# Patient Record
Sex: MALE | Race: WHITE | Employment: FULL TIME | URBAN - METROPOLITAN AREA
[De-identification: names, ages, dates, MRNs, and addresses within clinical notes are randomized per-mention and may not be internally consistent; named-entity substitution may affect disease eponyms.]

---

## 2017-08-18 PROCEDURE — 84153 ASSAY OF PSA TOTAL: CPT

## 2017-08-18 PROCEDURE — 85025 COMPLETE CBC W/AUTO DIFF WBC: CPT

## 2017-08-18 PROCEDURE — 84403 ASSAY OF TOTAL TESTOSTERONE: CPT

## 2017-08-19 ENCOUNTER — LAB ENCOUNTER (OUTPATIENT)
Dept: LAB | Age: 57
End: 2017-08-19
Attending: INTERNAL MEDICINE
Payer: COMMERCIAL

## 2017-08-19 DIAGNOSIS — E23.0 HYPOPITUITARISM (HCC): Primary | ICD-10-CM

## 2017-08-19 LAB
BASOPHILS # BLD AUTO: 0.02 X10(3) UL (ref 0–0.1)
BASOPHILS NFR BLD AUTO: 0.5 %
EOSINOPHIL # BLD AUTO: 0.07 X10(3) UL (ref 0–0.3)
EOSINOPHIL NFR BLD AUTO: 1.8 %
ERYTHROCYTE [DISTWIDTH] IN BLOOD BY AUTOMATED COUNT: 15 % (ref 11.5–16)
HCT VFR BLD AUTO: 47.7 % (ref 37–53)
HGB BLD-MCNC: 14.9 G/DL (ref 13–17)
IMMATURE GRANULOCYTE COUNT: 0.03 X10(3) UL (ref 0–1)
IMMATURE GRANULOCYTE RATIO %: 0.8 %
LYMPHOCYTES # BLD AUTO: 1.65 X10(3) UL (ref 0.9–4)
LYMPHOCYTES NFR BLD AUTO: 42.3 %
MCH RBC QN AUTO: 28.1 PG (ref 27–33.2)
MCHC RBC AUTO-ENTMCNC: 31.2 G/DL (ref 31–37)
MCV RBC AUTO: 89.8 FL (ref 80–99)
MONOCYTES # BLD AUTO: 0.53 X10(3) UL (ref 0.1–0.6)
MONOCYTES NFR BLD AUTO: 13.6 %
NEUTROPHIL ABS PRELIM: 1.6 X10 (3) UL (ref 1.3–6.7)
NEUTROPHILS # BLD AUTO: 1.6 X10(3) UL (ref 1.3–6.7)
NEUTROPHILS NFR BLD AUTO: 41 %
PLATELET # BLD AUTO: 266 10(3)UL (ref 150–450)
PSA SERPL-MCNC: 0.65 NG/ML (ref 0.01–4)
RBC # BLD AUTO: 5.31 X10(6)UL (ref 4.3–5.7)
RED CELL DISTRIBUTION WIDTH-SD: 49.8 FL (ref 35.1–46.3)
TESTOSTERONE: 651.9 NG/DL (ref 241–827)
WBC # BLD AUTO: 3.9 X10(3) UL (ref 4–13)

## 2017-10-17 ENCOUNTER — OFFICE VISIT (OUTPATIENT)
Dept: FAMILY MEDICINE CLINIC | Facility: CLINIC | Age: 57
End: 2017-10-17

## 2017-10-17 VITALS
OXYGEN SATURATION: 98 % | SYSTOLIC BLOOD PRESSURE: 122 MMHG | RESPIRATION RATE: 16 BRPM | WEIGHT: 284 LBS | TEMPERATURE: 100 F | DIASTOLIC BLOOD PRESSURE: 80 MMHG | HEIGHT: 70 IN | BODY MASS INDEX: 40.66 KG/M2 | HEART RATE: 84 BPM

## 2017-10-17 DIAGNOSIS — J01.00 ACUTE NON-RECURRENT MAXILLARY SINUSITIS: Primary | ICD-10-CM

## 2017-10-17 PROCEDURE — 99213 OFFICE O/P EST LOW 20 MIN: CPT | Performed by: PHYSICIAN ASSISTANT

## 2017-10-17 RX ORDER — AMOXICILLIN AND CLAVULANATE POTASSIUM 875; 125 MG/1; MG/1
1 TABLET, FILM COATED ORAL 2 TIMES DAILY
Qty: 20 TABLET | Refills: 0 | Status: SHIPPED | OUTPATIENT
Start: 2017-10-17 | End: 2017-10-27

## 2017-10-17 RX ORDER — FLUTICASONE PROPIONATE 50 MCG
2 SPRAY, SUSPENSION (ML) NASAL DAILY
Qty: 1 INHALER | Refills: 0 | Status: SHIPPED | OUTPATIENT
Start: 2017-10-17 | End: 2019-04-30

## 2017-10-17 NOTE — PATIENT INSTRUCTIONS
-Sudafed  -Flonase  -Cool mist humidifier at night  -Warm tea with honey  -Motrin for pain or fever  -Go to ER with any worsening symptoms            Acute Bacterial Rhinosinusitis (ABRS)    Acute bacterial rhinosinusitis (ABRS) is an infection of your bryan · Antibiotic medicine. This is for symptoms that last for at least 10 to 14 days. · Nasal corticosteroid medicine. Drops or spray used in the nose can lessen swelling and congestion. · Over-the-counter pain medicine.  This is to lessen sinus pain and pres

## 2018-05-08 ENCOUNTER — CHARTING TRANS (OUTPATIENT)
Dept: OTHER | Age: 58
End: 2018-05-08

## 2019-10-01 RX ORDER — TRIAMCINOLONE ACETONIDE 0.25 MG/G
OINTMENT TOPICAL
Qty: 15 G | Refills: 0 | Status: SHIPPED | OUTPATIENT
Start: 2019-10-01

## 2020-01-27 ENCOUNTER — OFFICE VISIT (OUTPATIENT)
Dept: FAMILY MEDICINE CLINIC | Facility: CLINIC | Age: 60
End: 2020-01-27
Payer: COMMERCIAL

## 2020-01-27 VITALS
HEIGHT: 70 IN | TEMPERATURE: 98 F | OXYGEN SATURATION: 99 % | DIASTOLIC BLOOD PRESSURE: 84 MMHG | HEART RATE: 57 BPM | BODY MASS INDEX: 42.8 KG/M2 | SYSTOLIC BLOOD PRESSURE: 126 MMHG | RESPIRATION RATE: 16 BRPM | WEIGHT: 299 LBS

## 2020-01-27 DIAGNOSIS — J39.2 THROAT IRRITATION: Primary | ICD-10-CM

## 2020-01-27 PROCEDURE — 99203 OFFICE O/P NEW LOW 30 MIN: CPT | Performed by: NURSE PRACTITIONER

## 2020-01-27 RX ORDER — FLUTICASONE PROPIONATE 50 MCG
2 SPRAY, SUSPENSION (ML) NASAL DAILY
Qty: 1 BOTTLE | Refills: 0 | Status: SHIPPED | OUTPATIENT
Start: 2020-01-27 | End: 2020-02-26

## 2020-01-28 NOTE — PROGRESS NOTES
CHIEF COMPLAINT:   Patient presents with: Other: dry throat sx 2 weeks. HPI:   Heron Patton is a 61year old male presents to clinic with symptoms of  Dry, scratchy sore throat. Patient has had for 2 weeks.  Reports mostly dry throat, relieved by dri 57   Temp 98.2 °F (36.8 °C) (Oral)   Resp 16   Ht 70\"   Wt 299 lb (135.6 kg)   SpO2 99%   BMI 42.90 kg/m²   GENERAL: well developed, well nourished,in no apparent distress  SKIN: no rashes,no suspicious lesions  HEAD: atraumatic, normocephalic  EYES: conj 100.4 persists for 72 hours.

## 2020-08-26 ENCOUNTER — OFFICE VISIT (OUTPATIENT)
Dept: HEMATOLOGY/ONCOLOGY | Facility: HOSPITAL | Age: 60
End: 2020-08-26
Attending: INTERNAL MEDICINE
Payer: COMMERCIAL

## 2020-08-26 VITALS
BODY MASS INDEX: 42.9 KG/M2 | TEMPERATURE: 97 F | SYSTOLIC BLOOD PRESSURE: 134 MMHG | RESPIRATION RATE: 16 BRPM | DIASTOLIC BLOOD PRESSURE: 76 MMHG | WEIGHT: 299.63 LBS | HEIGHT: 70 IN | HEART RATE: 67 BPM | OXYGEN SATURATION: 98 %

## 2020-08-26 DIAGNOSIS — D47.2 MGUS (MONOCLONAL GAMMOPATHY OF UNKNOWN SIGNIFICANCE): Primary | ICD-10-CM

## 2020-08-26 LAB
ALBUMIN SERPL-MCNC: 3.6 G/DL (ref 3.4–5)
ALBUMIN/GLOB SERPL: 0.6 {RATIO} (ref 1–2)
ALP LIVER SERPL-CCNC: 66 U/L (ref 45–117)
ALT SERPL-CCNC: 69 U/L (ref 16–61)
ANION GAP SERPL CALC-SCNC: 4 MMOL/L (ref 0–18)
AST SERPL-CCNC: 42 U/L (ref 15–37)
B2 MICROGLOB SERPL-MCNC: 0.22 MG/DL (ref 0.11–0.25)
BASOPHILS # BLD AUTO: 0.02 X10(3) UL (ref 0–0.2)
BASOPHILS NFR BLD AUTO: 0.4 %
BILIRUB SERPL-MCNC: 0.7 MG/DL (ref 0.1–2)
BUN BLD-MCNC: 17 MG/DL (ref 7–18)
BUN/CREAT SERPL: 18.5 (ref 10–20)
CALCIUM BLD-MCNC: 9.7 MG/DL (ref 8.5–10.1)
CHLORIDE SERPL-SCNC: 105 MMOL/L (ref 98–112)
CO2 SERPL-SCNC: 28 MMOL/L (ref 21–32)
CREAT BLD-MCNC: 0.92 MG/DL (ref 0.7–1.3)
DEPRECATED RDW RBC AUTO: 46.7 FL (ref 35.1–46.3)
EOSINOPHIL # BLD AUTO: 0.07 X10(3) UL (ref 0–0.7)
EOSINOPHIL NFR BLD AUTO: 1.6 %
ERYTHROCYTE [DISTWIDTH] IN BLOOD BY AUTOMATED COUNT: 14.6 % (ref 11–15)
GLOBULIN PLAS-MCNC: 5.6 G/DL (ref 2.8–4.4)
GLUCOSE BLD-MCNC: 96 MG/DL (ref 70–99)
HCT VFR BLD AUTO: 44.3 % (ref 39–53)
HGB BLD-MCNC: 13.8 G/DL (ref 13–17.5)
IGA SERPL-MCNC: 182 MG/DL (ref 70–312)
IGM SERPL-MCNC: 43.9 MG/DL (ref 43–279)
IMM GRANULOCYTES # BLD AUTO: 0.03 X10(3) UL (ref 0–1)
IMM GRANULOCYTES NFR BLD: 0.7 %
IMMUNOGLOBULIN PNL SER-MCNC: 2470 MG/DL (ref 791–1643)
LDH SERPL L TO P-CCNC: 193 U/L
LYMPHOCYTES # BLD AUTO: 1.78 X10(3) UL (ref 1–4)
LYMPHOCYTES NFR BLD AUTO: 39.8 %
M PROTEIN MFR SERPL ELPH: 9.2 G/DL (ref 6.4–8.2)
MCH RBC QN AUTO: 27.5 PG (ref 26–34)
MCHC RBC AUTO-ENTMCNC: 31.2 G/DL (ref 31–37)
MCV RBC AUTO: 88.4 FL (ref 80–100)
MONOCYTES # BLD AUTO: 0.44 X10(3) UL (ref 0.1–1)
MONOCYTES NFR BLD AUTO: 9.8 %
NEUTROPHILS # BLD AUTO: 2.13 X10 (3) UL (ref 1.5–7.7)
NEUTROPHILS # BLD AUTO: 2.13 X10(3) UL (ref 1.5–7.7)
NEUTROPHILS NFR BLD AUTO: 47.7 %
OSMOLALITY SERPL CALC.SUM OF ELEC: 285 MOSM/KG (ref 275–295)
PATIENT FASTING Y/N/NP: NO
PLATELET # BLD AUTO: 185 10(3)UL (ref 150–450)
POTASSIUM SERPL-SCNC: 4.1 MMOL/L (ref 3.5–5.1)
RBC # BLD AUTO: 5.01 X10(6)UL (ref 4.3–5.7)
SODIUM SERPL-SCNC: 137 MMOL/L (ref 136–145)
WBC # BLD AUTO: 4.5 X10(3) UL (ref 4–11)

## 2020-08-26 PROCEDURE — 99203 OFFICE O/P NEW LOW 30 MIN: CPT | Performed by: INTERNAL MEDICINE

## 2020-08-26 RX ORDER — MULTIVIT-MIN/IRON/FOLIC ACID/K 18-600-40
CAPSULE ORAL
COMMUNITY

## 2020-08-26 RX ORDER — TRIAMCINOLONE ACETONIDE 0.25 MG/G
OINTMENT TOPICAL
COMMUNITY
Start: 2018-05-08

## 2020-08-26 NOTE — PROGRESS NOTES
Pt here to establish care, had been seen at [facility] [place] Chandrakant Miranda in the past.   Has been seen Q6 months for surveillance. Feeling well.    Education Record    Learner:  Patient    Disease / Missy Graver of care    Barriers / Limitations:  None   Comme

## 2020-08-26 NOTE — CONSULTS
[facility] Report of Consultation    Patient Name: Brianna Scherer   YOB: 1960   Medical Record Number: [id number]   CSN: [id number]   Consulting Physician: Callie Lanza MD  Referring Physician(s): Jostin Hood  Date of Consultation: 8/2 History:    , working.     Allergies:   No Known Allergies    Current Medications:    Current Outpatient Medications:   •  Triamcinolone Acetonide 0.025 % External Ointment, Apply bid only 1 week, Disp: , Rfl:   •  Vitamin D, Cholecalciferol, 50 MCG normal.  Abdomen: Soft, non tender with good bowel sounds. No hepatosplenomegaly/mass. Extremities: Pedal pulses are present. No edema. Neurological: Grossly intact.       Labs:         Assessment and Plan:    # IgG kappa MGUS: 61year old that was maurice

## 2020-08-28 LAB
KAPPA FREE LIGHT CHAIN: 3.77 MG/DL (ref 0.33–1.94)
KAPPA/LAMBDA FLC RATIO: 3.79 (ref 0.26–1.65)
LAMBDA FREE LIGHT CHAIN: 0.99 MG/DL (ref 0.57–2.63)

## 2020-09-01 LAB
ALBUMIN SERPL ELPH-MCNC: 3.92 G/DL (ref 3.75–5.21)
ALBUMIN/GLOB SERPL: 0.81 {RATIO} (ref 1–2)
ALPHA1 GLOB SERPL ELPH-MCNC: 0.28 G/DL (ref 0.19–0.46)
ALPHA2 GLOB SERPL ELPH-MCNC: 0.97 G/DL (ref 0.48–1.05)
B-GLOBULIN SERPL ELPH-MCNC: 0.91 G/DL (ref 0.68–1.23)
GAMMA GLOB SERPL ELPH-MCNC: 2.72 G/DL (ref 0.62–1.7)
M PROTEIN MFR SERPL ELPH: 8.8 G/DL (ref 6.4–8.2)
M-SPIKE 1: 1.44 G/DL (ref ?–0)

## 2020-09-08 ENCOUNTER — TELEPHONE (OUTPATIENT)
Dept: HEMATOLOGY/ONCOLOGY | Facility: HOSPITAL | Age: 60
End: 2020-09-08

## 2020-09-14 ENCOUNTER — LAB ENCOUNTER (OUTPATIENT)
Dept: LAB | Facility: HOSPITAL | Age: 60
End: 2020-09-14
Attending: INTERNAL MEDICINE
Payer: COMMERCIAL

## 2020-09-14 DIAGNOSIS — D47.2 MGUS (MONOCLONAL GAMMOPATHY OF UNKNOWN SIGNIFICANCE): ICD-10-CM

## 2020-09-14 PROCEDURE — 83883 ASSAY NEPHELOMETRY NOT SPEC: CPT

## 2020-09-14 PROCEDURE — 84156 ASSAY OF PROTEIN URINE: CPT

## 2020-09-14 PROCEDURE — 86335 IMMUNFIX E-PHORSIS/URINE/CSF: CPT

## 2020-09-17 LAB
FREE UR LAMBDA EXCRETION/DAY: 8.14 MG/D
FREE UR LAMBDA LIGHT CHAIN: 5.43 MG/L
FREE URINARY KAPPA EXCRETION/D: 445.92 MG/D
FREE URINARY KAPPA LIGHT CHAIN: 297.28 MG/L
HOURS COLLECTED: 24 HR
TOTAL VOLUME: 1500 ML

## 2020-09-23 ENCOUNTER — TELEPHONE (OUTPATIENT)
Dept: HEMATOLOGY/ONCOLOGY | Facility: HOSPITAL | Age: 60
End: 2020-09-23

## 2020-09-23 NOTE — TELEPHONE ENCOUNTER
Pippa Marroquin called asking for his 24 hour urine results. He says he will be available around 3:30 4:00 because he will be sleeping.  Please call

## 2020-09-24 ENCOUNTER — TELEPHONE (OUTPATIENT)
Dept: HEMATOLOGY/ONCOLOGY | Facility: HOSPITAL | Age: 60
End: 2020-09-24

## 2020-09-24 NOTE — TELEPHONE ENCOUNTER
MD Dionicio Sauer RN             Yes please call. Labs are stable, diag not changed. I bel repeating labd and urine in 6 months and seeing me 2 weeks after they are done      LVM to review the above with patient.    Asked him to c

## 2020-12-21 PROCEDURE — 88305 TISSUE EXAM BY PATHOLOGIST: CPT | Performed by: INTERNAL MEDICINE

## 2021-04-17 NOTE — PATIENT INSTRUCTIONS
When You Have a Sore Throat    A sore throat can be painful. There are many reasons why you may have a sore throat. Your healthcare provider will work with you to find the cause of your sore throat. He or she will also find the best treatment for you.   Raeann Parham During the exam, your healthcare provider checks your ears, nose, and throat for problems.  He or she also checks for swelling in the neck, and may listen to your chest.  Possible tests  Other tests your healthcare provider may perform include:  · A throat If your sore throat is due to a bacterial infection, antibiotics may speed healing and prevent complications.  Although group A streptococcus (\"strep throat\" or GAS) is the major treatable infection for a sore throat, GAS causes only 5% to 15% of sore thr © 9386-3517 [employer] [address] [facility] [place] [facility], [facility] [place]. All rights reserved. This information is not intended as a substitute for professional medical care. Always follow your healthcare professional's instructions. Patient/Caregiver provided printed discharge information.

## 2021-11-12 ENCOUNTER — TELEPHONE (OUTPATIENT)
Dept: HEMATOLOGY/ONCOLOGY | Facility: HOSPITAL | Age: 61
End: 2021-11-12

## 2021-11-12 NOTE — TELEPHONE ENCOUNTER
Patient calling and has labs ordered. He is asking if the order can be sent to [address] [place] [address] on \[facility] [place] [facility]\""  For this Upcoming Monday 11/15/2021.   He has other labs to be done there

## 2021-11-15 NOTE — TELEPHONE ENCOUNTER
LVM returning his call. Let her know I don't think that location/ center has access to lab orders from Dr Natalie Dumont. Reviewed that we can email the orders or he can  a hard copy.

## 2021-12-10 ENCOUNTER — TELEPHONE (OUTPATIENT)
Dept: HEMATOLOGY/ONCOLOGY | Facility: HOSPITAL | Age: 61
End: 2021-12-10

## 2021-12-10 DIAGNOSIS — D47.2 MGUS (MONOCLONAL GAMMOPATHY OF UNKNOWN SIGNIFICANCE): Primary | ICD-10-CM

## 2021-12-10 NOTE — TELEPHONE ENCOUNTER
Patient calling and stated he went to hospital yesterday for labs, and was advised there were no orders.     PT works nights  Ad may be sleeping,  OK to leave VM

## 2021-12-14 ENCOUNTER — LAB ENCOUNTER (OUTPATIENT)
Dept: LAB | Facility: HOSPITAL | Age: 61
End: 2021-12-14
Attending: INTERNAL MEDICINE
Payer: COMMERCIAL

## 2021-12-14 DIAGNOSIS — D64.9 ANEMIA, UNSPECIFIED TYPE: ICD-10-CM

## 2021-12-14 DIAGNOSIS — D47.2 MGUS (MONOCLONAL GAMMOPATHY OF UNKNOWN SIGNIFICANCE): ICD-10-CM

## 2021-12-14 PROCEDURE — 86334 IMMUNOFIX E-PHORESIS SERUM: CPT

## 2021-12-14 PROCEDURE — 82232 ASSAY OF BETA-2 PROTEIN: CPT

## 2021-12-14 PROCEDURE — 83550 IRON BINDING TEST: CPT

## 2021-12-14 PROCEDURE — 84165 PROTEIN E-PHORESIS SERUM: CPT

## 2021-12-14 PROCEDURE — 83540 ASSAY OF IRON: CPT

## 2021-12-14 PROCEDURE — 84166 PROTEIN E-PHORESIS/URINE/CSF: CPT

## 2021-12-14 PROCEDURE — 36415 COLL VENOUS BLD VENIPUNCTURE: CPT

## 2021-12-14 PROCEDURE — 82784 ASSAY IGA/IGD/IGG/IGM EACH: CPT

## 2021-12-14 PROCEDURE — 80053 COMPREHEN METABOLIC PANEL: CPT

## 2021-12-14 PROCEDURE — 83615 LACTATE (LD) (LDH) ENZYME: CPT

## 2021-12-14 PROCEDURE — 82728 ASSAY OF FERRITIN: CPT

## 2021-12-14 PROCEDURE — 86335 IMMUNFIX E-PHORSIS/URINE/CSF: CPT

## 2021-12-14 PROCEDURE — 85025 COMPLETE CBC W/AUTO DIFF WBC: CPT

## 2021-12-14 PROCEDURE — 83883 ASSAY NEPHELOMETRY NOT SPEC: CPT

## 2021-12-14 PROCEDURE — 84156 ASSAY OF PROTEIN URINE: CPT

## 2021-12-14 NOTE — PROGRESS NOTES
[facility] Progress Note      Patient Name:  Quynh Santiago  YOB: 1960  Medical Record Number:  [id number]    Date of visit:  12/15/2021    CHIEF COMPLAINT: MGUS    HPI:     64year old that I initially saw 8/20 when he presented wi auscultation and percussion. Abdomen: Soft, non tender, no hepato-splenomegaly. Extremities:  No edema. CNS: no focal deficit    Emotional well being: Patient's emotional well being was assessed.   No issues requiring acute psychosocial intervention were Absolute Prelim 1.87 1.50 - 7.70 x10 (3) uL    Neutrophil Absolute 1.87 1.50 - 7.70 x10(3) uL    Lymphocyte Absolute 1.67 1.00 - 4.00 x10(3) uL    Monocyte Absolute 0.56 0.10 - 1.00 x10(3) uL    Eosinophil Absolute 0.07 0.00 - 0.70 x10(3) uL    Basophil Ab

## 2021-12-15 ENCOUNTER — OFFICE VISIT (OUTPATIENT)
Dept: HEMATOLOGY/ONCOLOGY | Facility: HOSPITAL | Age: 61
End: 2021-12-15
Attending: INTERNAL MEDICINE
Payer: COMMERCIAL

## 2021-12-15 DIAGNOSIS — D47.2 MGUS (MONOCLONAL GAMMOPATHY OF UNKNOWN SIGNIFICANCE): ICD-10-CM

## 2021-12-15 DIAGNOSIS — D64.9 ANEMIA, UNSPECIFIED TYPE: Primary | ICD-10-CM

## 2021-12-15 PROCEDURE — 99213 OFFICE O/P EST LOW 20 MIN: CPT | Performed by: INTERNAL MEDICINE

## 2021-12-15 RX ORDER — SERTRALINE HYDROCHLORIDE 100 MG/1
100 TABLET, FILM COATED ORAL DAILY
COMMUNITY
Start: 2021-11-29

## 2021-12-15 RX ORDER — TESTOSTERONE 20.25 MG/1.25G
2 GEL TOPICAL DAILY
COMMUNITY
Start: 2021-11-30

## 2021-12-15 RX ORDER — GABAPENTIN 100 MG/1
CAPSULE ORAL NIGHTLY
COMMUNITY
Start: 2021-07-13

## 2021-12-15 RX ORDER — BUPROPION HYDROCHLORIDE 300 MG/1
300 TABLET ORAL
COMMUNITY
Start: 2021-11-29

## 2021-12-15 NOTE — PROGRESS NOTES
Education Record    Learner:  Patient    Disease / Diagnosis:MGUS    Barriers / Limitations:  None   Comments:    Method:  Discussion   Comments:    General Topics:  Plan of care reviewed   Comments:    Outcome:  Shows understanding   Comments:  Pt feeling

## 2021-12-28 ENCOUNTER — LAB ENCOUNTER (OUTPATIENT)
Dept: LAB | Age: 61
End: 2021-12-28
Attending: UROLOGY
Payer: COMMERCIAL

## 2021-12-28 DIAGNOSIS — E29.1 ANDROGEN DEFICIENCY: Primary | ICD-10-CM

## 2021-12-28 LAB — TESTOST SERPL-MCNC: 141.97 NG/DL

## 2021-12-28 PROCEDURE — 84403 ASSAY OF TOTAL TESTOSTERONE: CPT

## 2021-12-28 PROCEDURE — 36415 COLL VENOUS BLD VENIPUNCTURE: CPT

## 2022-09-19 NOTE — PROGRESS NOTES
Antimicrobial Stewardship Chart review: With focus on prescribed Antimicrobials, reviewed clinical presentation that includes fever and VS curve or trend, labs, Microbiology, imaging reports and notes as appropriate. Based on this brief analysis, here below are the suggestion. Diagnostic indication in chart for the Antimicrobial/s: S/P ELIANE    CURRENT ANTIMICROBIALS: Ceftriaxone( ABX # 8)    DISCONTINUE THE FOLLOWING ANTIBIOTIC(S):       Rationale:    ( )  No evidence of bacterial infection                          ( X )  Microbiology report does not support use                          (  )  Narrowing broad-spectrum empiric coverage                          (  )  Therapeutic duplication: overlapping antimicrobial spectrum                          (  )  Clinical situation dictates change                          (  )  Prolonged duration of therapy not needed                          (  )  Allergy/toxicity/drug interaction present                          (  ) More clinically/cost effective therapy available  ADD ANTIBIOTICS  Rationale:        (  ) Microbiology report supports use                          (  ) Expanded coverage needed: gm positive /negative / anaerobic / atypical infection possible                          (  ) More clinicaly/cost effective therapy than current regimen                          (  ) Needed for synergy                          (  ) Double coverage needed                          (  ) Less toxic therapy                          (  ) Antifungal therapy needed    No  Change in Antibiotics: ( )                               COMMENTS:       This communication is not a consultation. If a thorough analysis of the case is desired, please request an ID consultation. Catherine Banks MD  [facility])  Office #:     [phone]-NIVVRS [phone]   Office Fax: [phone] CHIEF COMPLAINT:   Patient presents with:  Cold: sinus headache,congestion,watery eyes,cough,chills,body aches sx x 4-5 days. HPI:   Vidal Bellamy is a 62year old male who presents for URI sxs for  4-5 days.   Patient reports nasal congestion, HA, na SKIN: no rashes or abnormal skin lesions  HEENT: See HPI  LUNGS: denies shortness of breath or wheezing, See HPI  CARDIOVASCULAR: denies chest pain or palpitations   GI: denies N/V/C or abdominal pain  NEURO: + headaches    EXAM:   /80 (BP Location: Fluticasone Propionate 50 MCG/ACT Nasal Suspension 1 Inhaler 0      Si sprays by Each Nare route daily. Risks, benefits, and side effects of medication explained and discussed.     Patient Instructions     -Sudafed  -Flonase  -Cool mist humid ABRS may be diagnosed if you’ve had an upper respiratory infection like a cold and cough for 10 or more days without improvement or with worsening symptoms. Your healthcare provider will ask about your symptoms and your medical history.  The provider will c © 3510-3055 [employer] [address]. [address] [facility] [place], [facility] [place]. All rights reserved. This information is not intended as a substitute for professional medical care. Always follow your healthcare professional's instructions.             The

## 2023-05-01 ENCOUNTER — TELEPHONE (OUTPATIENT)
Dept: HEMATOLOGY/ONCOLOGY | Facility: HOSPITAL | Age: 63
End: 2023-05-01

## 2023-05-01 DIAGNOSIS — D64.9 ANEMIA, UNSPECIFIED TYPE: Primary | ICD-10-CM

## 2023-05-01 DIAGNOSIS — D47.2 MGUS (MONOCLONAL GAMMOPATHY OF UNKNOWN SIGNIFICANCE): ICD-10-CM

## 2023-05-02 ENCOUNTER — TELEPHONE (OUTPATIENT)
Dept: HEMATOLOGY/ONCOLOGY | Facility: HOSPITAL | Age: 63
End: 2023-05-02

## 2023-05-05 ENCOUNTER — LAB ENCOUNTER (OUTPATIENT)
Dept: LAB | Facility: HOSPITAL | Age: 63
End: 2023-05-05
Attending: INTERNAL MEDICINE
Payer: COMMERCIAL

## 2023-05-05 DIAGNOSIS — D47.2 MGUS (MONOCLONAL GAMMOPATHY OF UNKNOWN SIGNIFICANCE): ICD-10-CM

## 2023-05-05 DIAGNOSIS — D64.9 ANEMIA, UNSPECIFIED TYPE: ICD-10-CM

## 2023-05-05 DIAGNOSIS — R29.1 MENINGISMUS: Primary | ICD-10-CM

## 2023-05-05 LAB
ALBUMIN SERPL-MCNC: 3 G/DL (ref 3.4–5)
ALBUMIN/GLOB SERPL: 0.6 {RATIO} (ref 1–2)
ALP LIVER SERPL-CCNC: 56 U/L
ALT SERPL-CCNC: 43 U/L
ANION GAP SERPL CALC-SCNC: 2 MMOL/L (ref 0–18)
AST SERPL-CCNC: 27 U/L (ref 15–37)
BASOPHILS # BLD AUTO: 0.02 X10(3) UL (ref 0–0.2)
BASOPHILS NFR BLD AUTO: 0.5 %
BILIRUB SERPL-MCNC: 0.4 MG/DL (ref 0.1–2)
BUN BLD-MCNC: 19 MG/DL (ref 7–18)
CALCIUM BLD-MCNC: 9 MG/DL (ref 8.5–10.1)
CHLORIDE SERPL-SCNC: 107 MMOL/L (ref 98–112)
CO2 SERPL-SCNC: 30 MMOL/L (ref 21–32)
CREAT BLD-MCNC: 1.04 MG/DL
DEPRECATED HBV CORE AB SER IA-ACNC: 132.4 NG/ML
EOSINOPHIL # BLD AUTO: 0.04 X10(3) UL (ref 0–0.7)
EOSINOPHIL NFR BLD AUTO: 1 %
ERYTHROCYTE [DISTWIDTH] IN BLOOD BY AUTOMATED COUNT: 14.5 %
FASTING STATUS PATIENT QL REPORTED: YES
GFR SERPLBLD BASED ON 1.73 SQ M-ARVRAT: 81 ML/MIN/1.73M2 (ref 60–?)
GLOBULIN PLAS-MCNC: 5.4 G/DL (ref 2.8–4.4)
GLUCOSE BLD-MCNC: 94 MG/DL (ref 70–99)
HCT VFR BLD AUTO: 44.9 %
HGB BLD-MCNC: 14.1 G/DL
IGA SERPL-MCNC: 157 MG/DL (ref 70–312)
IGM SERPL-MCNC: 31.4 MG/DL (ref 43–279)
IMM GRANULOCYTES # BLD AUTO: 0.02 X10(3) UL (ref 0–1)
IMM GRANULOCYTES NFR BLD: 0.5 %
IMMUNOGLOBULIN PNL SER-MCNC: 2310 MG/DL (ref 791–1643)
IRON SATN MFR SERPL: 20 %
IRON SERPL-MCNC: 66 UG/DL
LDH SERPL L TO P-CCNC: 182 U/L
LYMPHOCYTES # BLD AUTO: 1.88 X10(3) UL (ref 1–4)
LYMPHOCYTES NFR BLD AUTO: 45.5 %
MCH RBC QN AUTO: 27.7 PG (ref 26–34)
MCHC RBC AUTO-ENTMCNC: 31.4 G/DL (ref 31–37)
MCV RBC AUTO: 88.2 FL
MONOCYTES # BLD AUTO: 0.45 X10(3) UL (ref 0.1–1)
MONOCYTES NFR BLD AUTO: 10.9 %
NEUTROPHILS # BLD AUTO: 1.72 X10 (3) UL (ref 1.5–7.7)
NEUTROPHILS # BLD AUTO: 1.72 X10(3) UL (ref 1.5–7.7)
NEUTROPHILS NFR BLD AUTO: 41.6 %
OSMOLALITY SERPL CALC.SUM OF ELEC: 290 MOSM/KG (ref 275–295)
PLATELET # BLD AUTO: 190 10(3)UL (ref 150–450)
POTASSIUM SERPL-SCNC: 4.5 MMOL/L (ref 3.5–5.1)
PROT SERPL-MCNC: 8.4 G/DL (ref 6.4–8.2)
RBC # BLD AUTO: 5.09 X10(6)UL
SODIUM SERPL-SCNC: 139 MMOL/L (ref 136–145)
TESTOST SERPL-MCNC: 350.64 NG/DL
TIBC SERPL-MCNC: 328 UG/DL (ref 240–450)
TRANSFERRIN SERPL-MCNC: 220 MG/DL (ref 200–360)
WBC # BLD AUTO: 4.1 X10(3) UL (ref 4–11)

## 2023-05-05 PROCEDURE — 80053 COMPREHEN METABOLIC PANEL: CPT

## 2023-05-05 PROCEDURE — 83615 LACTATE (LD) (LDH) ENZYME: CPT

## 2023-05-05 PROCEDURE — 84165 PROTEIN E-PHORESIS SERUM: CPT

## 2023-05-05 PROCEDURE — 82784 ASSAY IGA/IGD/IGG/IGM EACH: CPT

## 2023-05-05 PROCEDURE — 83521 IG LIGHT CHAINS FREE EACH: CPT

## 2023-05-05 PROCEDURE — 85025 COMPLETE CBC W/AUTO DIFF WBC: CPT

## 2023-05-05 PROCEDURE — 84403 ASSAY OF TOTAL TESTOSTERONE: CPT

## 2023-05-05 PROCEDURE — 36415 COLL VENOUS BLD VENIPUNCTURE: CPT

## 2023-05-05 PROCEDURE — 86334 IMMUNOFIX E-PHORESIS SERUM: CPT

## 2023-05-05 PROCEDURE — 83550 IRON BINDING TEST: CPT

## 2023-05-05 PROCEDURE — 82728 ASSAY OF FERRITIN: CPT

## 2023-05-05 PROCEDURE — 83540 ASSAY OF IRON: CPT

## 2023-05-07 ENCOUNTER — LAB ENCOUNTER (OUTPATIENT)
Dept: LAB | Facility: HOSPITAL | Age: 63
End: 2023-05-07
Attending: INTERNAL MEDICINE
Payer: COMMERCIAL

## 2023-05-08 ENCOUNTER — APPOINTMENT (OUTPATIENT)
Dept: LAB | Facility: HOSPITAL | Age: 63
End: 2023-05-08
Attending: INTERNAL MEDICINE
Payer: COMMERCIAL

## 2023-05-08 LAB
ALBUMIN SERPL ELPH-MCNC: 3.51 G/DL (ref 3.75–5.21)
ALBUMIN/GLOB SERPL: 0.82 {RATIO} (ref 1–2)
ALPHA1 GLOB SERPL ELPH-MCNC: 0.27 G/DL (ref 0.19–0.46)
ALPHA2 GLOB SERPL ELPH-MCNC: 0.93 G/DL (ref 0.48–1.05)
B-GLOBULIN SERPL ELPH-MCNC: 0.76 G/DL (ref 0.68–1.23)
GAMMA GLOB SERPL ELPH-MCNC: 2.33 G/DL (ref 0.62–1.7)
KAPPA LC FREE SER-MCNC: 6.72 MG/DL (ref 0.33–1.94)
KAPPA LC FREE/LAMBDA FREE SER NEPH: 5.82 {RATIO} (ref 0.26–1.65)
LAMBDA LC FREE SERPL-MCNC: 1.15 MG/DL (ref 0.57–2.63)
M PROTEIN 1 SERPL ELPH-MCNC: 1.46 G/DL (ref ?–0)
PROT SERPL-MCNC: 7.8 G/DL (ref 6.4–8.2)

## 2023-05-10 ENCOUNTER — OFFICE VISIT (OUTPATIENT)
Dept: HEMATOLOGY/ONCOLOGY | Facility: HOSPITAL | Age: 63
End: 2023-05-10
Attending: INTERNAL MEDICINE
Payer: COMMERCIAL

## 2023-05-10 VITALS
BODY MASS INDEX: 43.21 KG/M2 | OXYGEN SATURATION: 97 % | HEART RATE: 60 BPM | HEIGHT: 70 IN | WEIGHT: 301.81 LBS | DIASTOLIC BLOOD PRESSURE: 78 MMHG | RESPIRATION RATE: 20 BRPM | TEMPERATURE: 97 F | SYSTOLIC BLOOD PRESSURE: 123 MMHG

## 2023-05-10 DIAGNOSIS — D47.2 MGUS (MONOCLONAL GAMMOPATHY OF UNKNOWN SIGNIFICANCE): Primary | ICD-10-CM

## 2023-05-10 PROCEDURE — 99214 OFFICE O/P EST MOD 30 MIN: CPT | Performed by: INTERNAL MEDICINE

## 2023-05-10 RX ORDER — TADALAFIL 5 MG/1
1 TABLET ORAL AS NEEDED
COMMUNITY
Start: 2021-01-12 | End: 2023-05-10 | Stop reason: ALTCHOICE

## 2023-05-10 RX ORDER — GABAPENTIN 300 MG/1
300 CAPSULE ORAL 3 TIMES DAILY
COMMUNITY
Start: 2023-03-27

## 2023-05-10 RX ORDER — FLUOXETINE HYDROCHLORIDE 40 MG/1
1 CAPSULE ORAL DAILY
COMMUNITY
Start: 2022-06-28

## 2023-05-10 RX ORDER — BUPROPION HYDROCHLORIDE 150 MG/1
TABLET ORAL
COMMUNITY
Start: 2023-03-27

## 2023-05-10 NOTE — PROGRESS NOTES
Education Record    Learner:  Patient    Disease / Diagnosis: anemia/ MGUS. Last visit 12/2021    Barriers / Limitations:  None   Comments:    Method:  Discussion   Comments:    General Topics:  Plan of care reviewed   Comments:    Outcome:  Shows understanding   Comments:  Pt reports recently noted right hand with tingling in fingers, and falling asleep at night, given a brace that helping. Takes gabapentin for neuropathy in his feet. Dosing it 300mg morning and noon, and 600 at HS.

## 2023-11-03 ENCOUNTER — LAB ENCOUNTER (OUTPATIENT)
Dept: LAB | Facility: HOSPITAL | Age: 63
End: 2023-11-03
Attending: INTERNAL MEDICINE
Payer: COMMERCIAL

## 2023-11-03 DIAGNOSIS — R79.89 HYPOURICEMIA: Primary | ICD-10-CM

## 2023-11-03 DIAGNOSIS — D47.2 MGUS (MONOCLONAL GAMMOPATHY OF UNKNOWN SIGNIFICANCE): ICD-10-CM

## 2023-11-03 LAB
ALBUMIN SERPL-MCNC: 3.2 G/DL (ref 3.4–5)
ALBUMIN/GLOB SERPL: 0.5 {RATIO} (ref 1–2)
ALP LIVER SERPL-CCNC: 60 U/L
ALT SERPL-CCNC: 38 U/L
ANION GAP SERPL CALC-SCNC: 4 MMOL/L (ref 0–18)
AST SERPL-CCNC: 22 U/L (ref 15–37)
B2 MICROGLOB SERPL-MCNC: 0.23 MG/DL (ref 0.11–0.25)
BASOPHILS # BLD AUTO: 0.01 X10(3) UL (ref 0–0.2)
BASOPHILS NFR BLD AUTO: 0.2 %
BILIRUB SERPL-MCNC: 0.6 MG/DL (ref 0.1–2)
BUN BLD-MCNC: 20 MG/DL (ref 9–23)
CALCIUM BLD-MCNC: 8.9 MG/DL (ref 8.5–10.1)
CHLORIDE SERPL-SCNC: 106 MMOL/L (ref 98–112)
CO2 SERPL-SCNC: 27 MMOL/L (ref 21–32)
COMPLEXED PSA SERPL-MCNC: 1.19 NG/ML (ref ?–4)
CREAT BLD-MCNC: 1.15 MG/DL
EGFRCR SERPLBLD CKD-EPI 2021: 72 ML/MIN/1.73M2 (ref 60–?)
EOSINOPHIL # BLD AUTO: 0.05 X10(3) UL (ref 0–0.7)
EOSINOPHIL NFR BLD AUTO: 1.2 %
ERYTHROCYTE [DISTWIDTH] IN BLOOD BY AUTOMATED COUNT: 14 %
FASTING STATUS PATIENT QL REPORTED: YES
GLOBULIN PLAS-MCNC: 5.9 G/DL (ref 2.8–4.4)
GLUCOSE BLD-MCNC: 94 MG/DL (ref 70–99)
HCT VFR BLD AUTO: 43.1 %
HGB BLD-MCNC: 14 G/DL
IGA SERPL-MCNC: 152 MG/DL (ref 70–312)
IGM SERPL-MCNC: 31.3 MG/DL (ref 43–279)
IMM GRANULOCYTES # BLD AUTO: 0.02 X10(3) UL (ref 0–1)
IMM GRANULOCYTES NFR BLD: 0.5 %
IMMUNOGLOBULIN PNL SER-MCNC: 2850 MG/DL (ref 791–1643)
LDH SERPL L TO P-CCNC: 208 U/L
LYMPHOCYTES # BLD AUTO: 1.68 X10(3) UL (ref 1–4)
LYMPHOCYTES NFR BLD AUTO: 41.3 %
MCH RBC QN AUTO: 27.8 PG (ref 26–34)
MCHC RBC AUTO-ENTMCNC: 32.5 G/DL (ref 31–37)
MCV RBC AUTO: 85.7 FL
MONOCYTES # BLD AUTO: 0.45 X10(3) UL (ref 0.1–1)
MONOCYTES NFR BLD AUTO: 11.1 %
NEUTROPHILS # BLD AUTO: 1.86 X10 (3) UL (ref 1.5–7.7)
NEUTROPHILS # BLD AUTO: 1.86 X10(3) UL (ref 1.5–7.7)
NEUTROPHILS NFR BLD AUTO: 45.7 %
OSMOLALITY SERPL CALC.SUM OF ELEC: 286 MOSM/KG (ref 275–295)
PLATELET # BLD AUTO: 223 10(3)UL (ref 150–450)
POTASSIUM SERPL-SCNC: 4.4 MMOL/L (ref 3.5–5.1)
PROT SERPL-MCNC: 9.1 G/DL (ref 6.4–8.2)
RBC # BLD AUTO: 5.03 X10(6)UL
SODIUM SERPL-SCNC: 137 MMOL/L (ref 136–145)
WBC # BLD AUTO: 4.1 X10(3) UL (ref 4–11)

## 2023-11-03 PROCEDURE — 83521 IG LIGHT CHAINS FREE EACH: CPT

## 2023-11-03 PROCEDURE — 84165 PROTEIN E-PHORESIS SERUM: CPT

## 2023-11-03 PROCEDURE — 86334 IMMUNOFIX E-PHORESIS SERUM: CPT

## 2023-11-03 PROCEDURE — 84403 ASSAY OF TOTAL TESTOSTERONE: CPT

## 2023-11-03 PROCEDURE — 82232 ASSAY OF BETA-2 PROTEIN: CPT

## 2023-11-03 PROCEDURE — 36415 COLL VENOUS BLD VENIPUNCTURE: CPT

## 2023-11-03 PROCEDURE — 85025 COMPLETE CBC W/AUTO DIFF WBC: CPT

## 2023-11-03 PROCEDURE — 84402 ASSAY OF FREE TESTOSTERONE: CPT

## 2023-11-03 PROCEDURE — 83615 LACTATE (LD) (LDH) ENZYME: CPT

## 2023-11-03 PROCEDURE — 80053 COMPREHEN METABOLIC PANEL: CPT

## 2023-11-03 PROCEDURE — 82784 ASSAY IGA/IGD/IGG/IGM EACH: CPT

## 2023-11-07 LAB
FREE TESTOST DIRECT: 3.3 PG/ML
TESTOSTERONE: 347 NG/DL

## 2023-11-08 LAB
ALBUMIN SERPL ELPH-MCNC: 3.62 G/DL (ref 3.75–5.21)
ALBUMIN/GLOB SERPL: 0.76 {RATIO} (ref 1–2)
ALPHA1 GLOB SERPL ELPH-MCNC: 0.27 G/DL (ref 0.19–0.46)
ALPHA2 GLOB SERPL ELPH-MCNC: 0.98 G/DL (ref 0.48–1.05)
B-GLOBULIN SERPL ELPH-MCNC: 0.83 G/DL (ref 0.68–1.23)
GAMMA GLOB SERPL ELPH-MCNC: 2.7 G/DL (ref 0.62–1.7)
KAPPA LC FREE SER-MCNC: 6.12 MG/DL (ref 0.33–1.94)
KAPPA LC FREE/LAMBDA FREE SER NEPH: 5.83 {RATIO} (ref 0.26–1.65)
LAMBDA LC FREE SERPL-MCNC: 1.05 MG/DL (ref 0.57–2.63)
M PROTEIN 1 SERPL ELPH-MCNC: 1.76 G/DL (ref ?–0)
PROT SERPL-MCNC: 8.4 G/DL (ref 5.7–8.2)

## 2023-11-22 ENCOUNTER — OFFICE VISIT (OUTPATIENT)
Dept: HEMATOLOGY/ONCOLOGY | Facility: HOSPITAL | Age: 63
End: 2023-11-22
Attending: INTERNAL MEDICINE
Payer: COMMERCIAL

## 2023-11-22 ENCOUNTER — TELEPHONE (OUTPATIENT)
Dept: HEMATOLOGY/ONCOLOGY | Facility: HOSPITAL | Age: 63
End: 2023-11-22

## 2023-11-22 VITALS
BODY MASS INDEX: 43 KG/M2 | OXYGEN SATURATION: 98 % | HEART RATE: 67 BPM | WEIGHT: 299.19 LBS | SYSTOLIC BLOOD PRESSURE: 129 MMHG | DIASTOLIC BLOOD PRESSURE: 79 MMHG | TEMPERATURE: 98 F

## 2023-11-22 DIAGNOSIS — D47.2 MGUS (MONOCLONAL GAMMOPATHY OF UNKNOWN SIGNIFICANCE): Primary | ICD-10-CM

## 2023-11-22 PROCEDURE — 99214 OFFICE O/P EST MOD 30 MIN: CPT | Performed by: INTERNAL MEDICINE

## 2023-11-22 NOTE — PROGRESS NOTES
Education Record    Learner:  Patient    Disease / Diagnosis:MGUS    Barriers / Limitations:  None   Comments:    Method:  Discussion   Comments:    General Topics:  Plan of care reviewed   Comments:    Outcome:  Shows understanding   Comments:    Patient here for follow-up. States neuropathy remains unchanged. Having some impaired mobility due to neuropathy as well.

## 2023-12-04 VITALS — WEIGHT: 290 LBS | HEIGHT: 70 IN | BODY MASS INDEX: 41.52 KG/M2

## 2023-12-11 ENCOUNTER — OFFICE VISIT (OUTPATIENT)
Dept: FAMILY MEDICINE CLINIC | Facility: CLINIC | Age: 63
End: 2023-12-11
Payer: COMMERCIAL

## 2023-12-11 VITALS
OXYGEN SATURATION: 96 % | HEART RATE: 82 BPM | HEIGHT: 70 IN | WEIGHT: 290 LBS | RESPIRATION RATE: 18 BRPM | DIASTOLIC BLOOD PRESSURE: 63 MMHG | TEMPERATURE: 98 F | SYSTOLIC BLOOD PRESSURE: 118 MMHG | BODY MASS INDEX: 41.52 KG/M2

## 2023-12-11 DIAGNOSIS — J06.9 VIRAL UPPER RESPIRATORY ILLNESS: Primary | ICD-10-CM

## 2023-12-11 PROCEDURE — 87635 SARS-COV-2 COVID-19 AMP PRB: CPT | Performed by: FAMILY MEDICINE

## 2023-12-11 NOTE — PATIENT INSTRUCTIONS
Your testing will be back in 24-36 hours. Use OTC meds for comfort as needed--  Ibuprofen/Tylenol for fever/pain  Use Benadryl at bedtime to reduce drainage and promote rest.  Zyrtec/Claritin/Allegra in the AM to reduce nasal drainage without sedation. Use saline nasal sprays to reduce congestion and thin secretions. Use Delsym for cough. Consider applying thomas's vapo-rub or eucayptus oil to chest and feet at bedtime to reduce chest and nasal congestion. Warm tea with honey, cough lozenges, vaporizers/steam etc.    If no better in 2-3 days, follow-up with your PCP for further evaluation.

## 2023-12-12 ENCOUNTER — TELEPHONE (OUTPATIENT)
Dept: FAMILY MEDICINE CLINIC | Facility: CLINIC | Age: 63
End: 2023-12-12

## 2023-12-12 DIAGNOSIS — U07.1 COVID-19 VIRUS INFECTION: Primary | ICD-10-CM

## 2023-12-12 LAB — SARS-COV-2 RNA RESP QL NAA+PROBE: DETECTED

## 2023-12-13 NOTE — IMAGING NOTE
Call placed to pt to cancel his CT Biopsy Bone Marrow & Aspiration for 12/14/2023. Pt had a positive COVID 19 test on 12/11/2023. Central scheduling emailed to reach out to the patient to reschedule.

## 2023-12-14 ENCOUNTER — APPOINTMENT (OUTPATIENT)
Dept: LAB | Facility: HOSPITAL | Age: 63
End: 2023-12-14
Attending: INTERNAL MEDICINE
Payer: COMMERCIAL

## 2023-12-14 ENCOUNTER — HOSPITAL ENCOUNTER (OUTPATIENT)
Dept: CT IMAGING | Facility: HOSPITAL | Age: 63
Discharge: HOME | End: 2023-12-14
Attending: INTERNAL MEDICINE
Payer: COMMERCIAL

## 2024-01-04 ENCOUNTER — TELEPHONE (OUTPATIENT)
Dept: NEUROLOGY | Facility: CLINIC | Age: 64
End: 2024-01-04

## 2024-01-04 ENCOUNTER — OFFICE VISIT (OUTPATIENT)
Dept: NEUROLOGY | Facility: CLINIC | Age: 64
End: 2024-01-04
Payer: COMMERCIAL

## 2024-01-04 VITALS
BODY MASS INDEX: 41.52 KG/M2 | RESPIRATION RATE: 16 BRPM | WEIGHT: 290 LBS | DIASTOLIC BLOOD PRESSURE: 68 MMHG | HEIGHT: 70 IN | HEART RATE: 66 BPM | SYSTOLIC BLOOD PRESSURE: 124 MMHG

## 2024-01-04 DIAGNOSIS — D47.2 MGUS (MONOCLONAL GAMMOPATHY OF UNKNOWN SIGNIFICANCE): ICD-10-CM

## 2024-01-04 DIAGNOSIS — G62.9 NEUROPATHY: Primary | ICD-10-CM

## 2024-01-04 PROCEDURE — 3008F BODY MASS INDEX DOCD: CPT | Performed by: OTHER

## 2024-01-04 PROCEDURE — 3078F DIAST BP <80 MM HG: CPT | Performed by: OTHER

## 2024-01-04 PROCEDURE — 3074F SYST BP LT 130 MM HG: CPT | Performed by: OTHER

## 2024-01-04 PROCEDURE — 99204 OFFICE O/P NEW MOD 45 MIN: CPT | Performed by: OTHER

## 2024-01-04 NOTE — PATIENT INSTRUCTIONS
Refill policies:    Allow 2-3 business days for refills; controlled substances may take longer.  Contact your pharmacy at least 5 days prior to running out of medication and have them send an electronic request or submit request through the “request refill” option in your [employer] account.  Refills are not addressed on weekends; covering physicians do not authorize routine medications on weekends.  No narcotics or controlled substances are refilled after noon on Fridays or by on call physicians.  By law, narcotics must be electronically prescribed.  A 30 day supply with no refills is the maximum allowed.  If your prescription is due for a refill, you may be due for a follow up appointment.  To best provide you care, patients receiving routine medications need to be seen at least once a year.  Patients receiving narcotic/controlled substance medications need to be seen at least once every 3 months.  In the event that your preferred pharmacy does not have the requested medication in stock (e.g. Backordered), it is your responsibility to find another pharmacy that has the requested medication available.  We will gladly send a new prescription to that pharmacy at your request.    Scheduling Tests:    If your physician has ordered radiology tests such as MRI or CT scans, please contact Central Scheduling at [phone] right away to schedule the test.  Once scheduled, the [facility] Centralized Referral Team will work with your insurance carrier to obtain pre-certification or prior authorization.  Depending on your insurance carrier, approval may take 3-10 days.  It is highly recommended patients assure they have received an authorization before having a test performed.  If test is done without insurance authorization, patient may be responsible for the entire amount billed.      Precertification and Prior Authorizations:  If your physician has recommended that you have a procedure or additional testing performed the [facility]  Centralized Referral Team will contact your insurance carrier to obtain pre-certification or prior authorization.    You are strongly encouraged to contact your insurance carrier to verify that your procedure/test has been approved and is a COVERED benefit.  Although the [facility] Centralized Referral Team does its due diligence, the insurance carrier gives the disclaimer that \"Although the procedure is authorized, this does not guarantee payment.\"    Ultimately the patient is responsible for payment.   Thank you for your understanding in this matter.  Paperwork Completion:  If you require FMLA or disability paperwork for your recovery, please make sure to either drop it off or have it faxed to our office at [phone]. Be sure the form has your name and date of birth on it.  The form will be faxed to our Forms Department and they will complete it for you.  There is a 25$ fee for all forms that need to be filled out.  Please be aware there is a 10-14 day turnaround time.  You will need to sign a release of information (MARY) form if your paperwork does not come with one.  You may call the Forms Department with any questions at [phone].  Their fax number is [phone].

## 2024-01-04 NOTE — TELEPHONE ENCOUNTER
Received EMG test results DOS: 9/15/23 and reviewed by Dr. Milan during ov on 1/4/24.    Copy to scanning

## 2024-01-04 NOTE — H&P
[facility] New Patient / Consult Visit    Elfego Sandoval is a 63 year old male.                         Referring MD: No ref. provider found    Chief Complaint   Patient presents with    Neurologic Problem     Referred by Heme/Onc, C/O bilat feet numbness w/o trips/falls, notes occasional balance changes    Test Results     EMG 9/15/23       HPI:    Elfego Sandoval is a 63 year old, who presents for evaluation of neuropathy.  Patient has history of MGUS and has been following with hematology/oncology.  He presents due to to symptoms of numbness in feet for the past 10 years.  In the past 4 years, he notes this is more noticeable. He used to feel like he was \"walking on a balled up sock\" but more recently noted numbness and cold sensation more notable at night.  He is on gabapentin for the past several years currently at 300 mg 4 times daily.  He denies side effects of excessive sedation, rash, double vision or peripheral edema.     Patient currently notes some irritation in his heel on the left side which improves when he walks more.  He denies tingling/numbness in hands currently but used to have this when sleeping and has been wearing brace on R hand for carpal tunnel symptoms with improvement; denies issues with dropping objects from hands. 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     Patient denies any falls or tripping over feet and is independent without using cane or walker.   Otherwise, patient denies any recent weight change, fevers, chills, nausea, double vision/ blurry vision / loss of vision, chest pain, palpitations, shortness of breath, rashes, joint pains, bowel / bladder incontinence or mood issues.     Past Medical History:   Diagnosis Date    COVID-19 12/12/2023    COUGH, NO HOSPITALIZATION    Depression     Hypogonadism  male     IBS (irritable bowel syndrome)     Monoclonal gammopathy of unknown significance (MGUS)     IgG kappa MGUS    Osteoarthritis     Prediabetes     DIET CONTROLLED    SLEEP APNEA     Sleep apnea     CPAP    Visual impairment     GLASSES     Past Surgical History:   Procedure Laterality Date    COLONOSCOPY      COLONOSCOPY,BIOPSY  02/2003    hyperplastic polyps    COLONOSCOPY,DIAGNOSTIC  07/25/2011     hemorrhoids, diveriticulosis, 2 5mm ascending adenomatous polyps    COLONOSCOPY,DIAGNOSTIC  08/29/2016    hepatic flexure and transverse polyps-adenomas    COLONOSCOPY,DIAGNOSTIC  12/21/2020    hepatic flexure, 2 sigmoid polyps    EGD N/A 12/21/2020    Procedure: ESOPHAGOGASTRODUODENOSCOPY, COLONOSCOPY, POSSIBLE BIOPSY, POSSIBLE POLYPECTOMY 09763, 05653;  Surgeon: Wes Bynum MD;  Location: [facility]    HERNIA SURGERY  1997    LAPAROSCOPY,DIAGNOSTIC  1998    wnl    OTHER SURGICAL HISTORY      PN cyst removal    SIGMOIDOSCOPY,DIAGNOSTIC  2003    polyps seen    SIGMOIDOSCOPY,DIAGNOSTIC  1998    internal hemorrhoids    UPPER GI ENDOSCOPY,DIAGNOSIS  07/25/2011    wnl, SB Bx    UPPER GI ENDOSCOPY,DIAGNOSIS  12/21/2020    normal, gastric and SB Bx taken     Social History     Socioeconomic History    Marital status:    Tobacco Use    Smoking status: Never    Smokeless tobacco: Never   Vaping Use    Vaping Use: Never used   Substance and Sexual Activity    Alcohol use: No     Alcohol/week: 0.0 standard drinks of alcohol    Drug use: No   Other Topics Concern    Caffeine Concern Yes     Comment: 1 cup coffee    Exercise No     Family History   Problem Relation Age of Onset    Cancer Father         prostate cancer    Diabetes Mother     Hypertension Mother     Cancer Mother 81        breast    Other (Other) Brother         polyps       Allergies:  No Known Allergies   Current Meds:  Current Outpatient Medications   Medication Sig Dispense Refill    buPROPion  MG Oral Tablet 24 Hr Take 1  tablet (150 mg total) by mouth daily.      FLUoxetine HCl 40 MG Oral Cap Take 1 capsule (40 mg total) by mouth daily.      gabapentin 300 MG Oral Cap Take by mouth. 300mg in AM, 300mg in afternoon, 600mg QHS      buPROPion 300 MG Oral Tablet 24 Hr Take 1 tablet (300 mg total) by mouth before breakfast.      Vitamin D, Cholecalciferol, 50 MCG (2000 UT) Oral Cap Take 4,000 Units by mouth daily.            ROS:   A comprehensive 10 point review of systems was completed.  Pertinent positives and negatives noted in the the HPI.      PHYSICAL EXAM:   /68 (BP Location: Left arm, Patient Position: Sitting, Cuff Size: adult)   Pulse 66   Resp 16   Ht 70\"   Wt 290 lb (131.5 kg)   BMI 41.61 kg/m²   Estimated body mass index is 41.61 kg/m² as calculated from the following:    Height as of this encounter: 70\".    Weight as of this encounter: 290 lb (131.5 kg).    GENERAL: well developed, well nourished, in no apparent distress  SKIN: no rashes  EYES: sclera anicteric, conjunctiva normal  HEENT: normocephalic  CARDIOVASCULAR: S1, S2 normal, RRR  LUNGS: clear to auscultation bilaterally  EXTREMITIES: no cyanosis, peripheral pulses intact    Neck: Supple; full range of motion; no carotid bruits    Mental status:  Alert and oriented to time, place, person, and situation  Speech: fluent  Language: normal naming, repetition, and comprehension  Memory: normal  Attention/concentration: normal    Fundoscopic Exam: optic discs sharp bilaterally    Cranial Nerves: II through XII  Optic:    Pupils: equally round and reactive to light with direct and consensual responses, normal accomodation   Visual acuity: Normal              Visual fields: Normal  Oculomotor/Trochlear/Abducens:    Eye Movements: EOMI without nystagmus  Trigeminal:   Facial sensation:intact to light touch bilaterally  Facial:   Smile symmetric, eyebrow raise symmetric  Vestibulocochlear:   Hearing: normal bilaterally  Glossopharyngeal/Vagus:   Palate elevates  symmetrically with midline uvula  Spinal accessory:   Shoulder Shrug: normal bilaterally   Lateral head turn: normal bilaterally  Hypoglossal:   Tongue movement: protrusion is midline with normal lateral movements    Motor System:  Strength: 5/5 throughout  Tone: normal    Sensory:  Pin is reduced in LE up to ankle bilaterally  Vibration is reduced only briefly present 2-3 sec R foot and 2 on L side at great toes  Proprioception is normal  Romberg is absent    Coordination:  Finger to nose normal bilaterally  Rapid alternating movements normal bilaterally  Heel to shin is normal bilaterally    DTRs:   2+ in UE and patella, trace ankle jerks bilaterally; toes downgoing bilaterally; no clonus          Gait:  Casual gait independent without cane or walker but mildly broad based; not able to walk on heels or toes due to pain and able to tandem for a few steps    TEST RESULTS/DATA REVIEWED:   Labs    Reviewed    Component      Latest Ref [facility] 11/3/2023   WBC      4.0 - 11.0 x10(3) uL 4.1    RBC      4.30 - 5.70 x10(6)uL 5.03    Hemoglobin      13.0 - 17.5 g/dL 14.0    Hematocrit      39.0 - 53.0 % 43.1    Platelet Count      150.0 - 450.0 10(3)uL 223.0    MCV      80.0 - 100.0 fL 85.7    MCH      26.0 - 34.0 pg 27.8    MCHC      31.0 - 37.0 g/dL 32.5    RDW      % 14.0    Prelim Neutrophil Abs      1.50 - 7.70 x10 (3) uL 1.86    Neutrophils Absolute      1.50 - 7.70 x10(3) uL 1.86    Lymphocytes Absolute      1.00 - 4.00 x10(3) uL 1.68    Monocytes Absolute      0.10 - 1.00 x10(3) uL 0.45    Eosinophils Absolute      0.00 - 0.70 x10(3) uL 0.05    Basophils Absolute      0.00 - 0.20 x10(3) uL 0.01    Immature Granulocyte Absolute      0.00 - 1.00 x10(3) uL 0.02    Neutrophils %      % 45.7    Lymphocytes %      % 41.3    Monocytes %      % 11.1    Eosinophils %      % 1.2    Basophils %      % 0.2    Immature Granulocyte %      % 0.5    Glucose      70 - 99 mg/dL 94    Sodium      136 - 145 mmol/L 137    Potassium      3.5  - 5.1 mmol/L 4.4    Chloride      98 - 112 mmol/L 106    Carbon Dioxide, Total      21.0 - 32.0 mmol/L 27.0    ANION GAP      0 - 18 mmol/L 4    BUN      9 - 23 mg/dL 20    CREATININE      0.70 - 1.30 mg/dL 1.15    CALCIUM      8.5 - 10.1 mg/dL 8.9    CALCULATED OSMOLALITY      275 - 295 mOsm/kg 286    EGFR      >=60 mL/min/1.73m2 72    AST (SGOT)      15 - 37 U/L 22    ALT (SGPT)      16 - 61 U/L 38    ALKALINE PHOSPHATASE      45 - 117 U/L 60    Total Bilirubin      0.1 - 2.0 mg/dL 0.6    PROTEIN, TOTAL      6.4 - 8.2 g/dL 9.1 (H)    PROTEIN, TOTAL      5.7 - 8.2 g/dL 8.4 (H)    Albumin      3.4 - 5.0 g/dL 3.2 (L)    Albumin      3.75 - 5.21 g/dL 3.62 (L)    Globulin      2.8 - 4.4 g/dL 5.9 (H)    A/G Ratio      1.0 - 2.0  0.5 (L)    Patient Fasting for CMP? Yes    ALPHA-1-GLOBULINS      0.19 - 0.46 g/dL 0.27    ALPHA-2-GLOBULINS      0.48 - 1.05 g/dL 0.98    BETA GLOBULINS      0.68 - 1.23 g/dL 0.83    GAMMA GLOBULINS      0.62 - 1.70 g/dL 2.70 (H)    ALBUMIN/GLOBULIN RATIO      1.00 - 2.00  0.76 (L)    SPE INTERPRETATION Monoclonal spike in the gamma region.    IMMUNOFIXATION Monoclonal IgG kappa. If clinically indicated, 24 hour urine monoclonal protein studies is suggested.    KAPPA FREE LIGHT CHAIN      0.330 - 1.940 mg/dL 6.116 (H)    LAMBDA FREE LIGHT CHAIN      0.571 - 2.630 mg/dL 1.049    KAPPA/LAMBDA FLC RATIO      0.26 - 1.65  5.83 (H)    M-Mark      <=0.00 g/dL 1.76 (H)    Reviewed By: Reviewed by Kelly Bardy M.D. Pathology 11/08/23 at 5:33 PM    IMMUNOGLOBULIN A      70.00 - 312.00 mg/dL 152.00    IMMUNOGLOBULIN G      791 - 1,643 mg/dL 2,850 (H)    IMMUNOGLOBULIN M      43.0 - 279.0 mg/dL 31.3 (L)       7/26/2023:  Mag Ab: negative per report in EMR    Other procedures  Reviewed    NCS/EMG (9/15/2023):     Full report to be scanned    Findings    Significant for absent sural, superficial peroneal sensory response; prolonged peak latency R median sensory with reduced amplitude (4.79 msec and 9.5  amp, velocity 36.5); median / ulnar mixed nerve comparison latency dif 1.02 msec, R median motor borderline prolonged peak latency 4.79 msec; otherwise normal    EMG with polyphase R TA, reduced recruitment R medial gastroc  Impression:   Median neuropathy at the Right carpal tunnel  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  Patient has history of MGUS and has been following with hematology/oncology.  He presents due to to symptoms of numbness in feet for the past 10 years.  In the past 4 years, he notes this is more noticeable. He used to feel like he was \"walking on a balled up sock\" but more recently noted numbness and cold sensation more notable at night.      Neurologic exam is suggestive of mild neuropathy with impaired tandem gait, reduced DTRs distally and reduced sensation to vibration and pin in distal LE mainly up to the ankles.  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  There is no evidence for acute denervation changes on NCS/EMG and no suggestion for a primary demyelinating neuropathy.  Etiology for neuropathy may be due to MGUS but will check for other causes with B12 level.  In addition, recommend patient follow up with hematology / oncology for management of MGUS.  Of note, he has previously had Anti-MAG Ab testing done which was negative and given mild symptoms, would hold off on immunosuppressive therapy for now and monitor for changes.     For treatment of neuropathic pain, he is on gabapentin for the past several years currently at 300 mg 4 times daily.  He denies side effects of excessive sedation, rash, double vision or peripheral edema. And recommend continues same dose; for R carpal tunnel syndrome, recommend continue to wear wrist splints at  night.    1. Neuropathy  As noted above   - Vitamin B12; Future    2. MGUS (monoclonal gammopathy of unknown significance)  As noted above    Return in about 3 months (around 4/4/2024).    Copy of note was sent to PCP    45 total minutes spent, including chart review, discussion of pertinent labs and NCS/EMG with plan of care / recommendations as noted above; patient allowed to ask questions and all questions answered to the best of my ability     Se Milan MD, Neurology  [facility]  Pager [phone]  1/4/2024

## 2024-01-05 ENCOUNTER — NURSE ONLY (OUTPATIENT)
Dept: LAB | Facility: HOSPITAL | Age: 64
End: 2024-01-05
Attending: INTERNAL MEDICINE
Payer: COMMERCIAL

## 2024-01-05 ENCOUNTER — HOSPITAL ENCOUNTER (OUTPATIENT)
Dept: CT IMAGING | Facility: HOSPITAL | Age: 64
Discharge: HOME | End: 2024-01-05
Attending: INTERNAL MEDICINE
Payer: COMMERCIAL

## 2024-01-05 VITALS
WEIGHT: 290 LBS | OXYGEN SATURATION: 91 % | SYSTOLIC BLOOD PRESSURE: 121 MMHG | DIASTOLIC BLOOD PRESSURE: 68 MMHG | RESPIRATION RATE: 16 BRPM | HEIGHT: 70 IN | TEMPERATURE: 98 F | BODY MASS INDEX: 41.52 KG/M2 | HEART RATE: 59 BPM

## 2024-01-05 DIAGNOSIS — D47.2 MONOCLONAL GAMMOPATHY OF UNKNOWN SIGNIFICANCE: Primary | ICD-10-CM

## 2024-01-05 DIAGNOSIS — D47.2 MGUS (MONOCLONAL GAMMOPATHY OF UNKNOWN SIGNIFICANCE): ICD-10-CM

## 2024-01-05 DIAGNOSIS — D47.2 MONOCLONAL GAMMOPATHY OF UNKNOWN SIGNIFICANCE: ICD-10-CM

## 2024-01-05 LAB
ERYTHROCYTE [DISTWIDTH] IN BLOOD BY AUTOMATED COUNT: 15.1 %
HCT VFR BLD AUTO: 42.4 %
HGB BLD-MCNC: 13.3 G/DL
INR BLD: 1 (ref 0.8–1.2)
MCH RBC QN AUTO: 27.8 PG (ref 26–34)
MCHC RBC AUTO-ENTMCNC: 31.4 G/DL (ref 31–37)
MCV RBC AUTO: 88.7 FL
PLATELET # BLD AUTO: 220 10(3)UL (ref 150–450)
PROTHROMBIN TIME: 13.2 SECONDS (ref 11.6–14.8)
RBC # BLD AUTO: 4.78 X10(6)UL
WBC # BLD AUTO: 3.8 X10(3) UL (ref 4–11)

## 2024-01-05 PROCEDURE — 88342 IMHCHEM/IMCYTCHM 1ST ANTB: CPT | Performed by: INTERNAL MEDICINE

## 2024-01-05 PROCEDURE — 88185 FLOWCYTOMETRY/TC ADD-ON: CPT | Performed by: INTERNAL MEDICINE

## 2024-01-05 PROCEDURE — 36415 COLL VENOUS BLD VENIPUNCTURE: CPT

## 2024-01-05 PROCEDURE — 88184 FLOWCYTOMETRY/ TC 1 MARKER: CPT | Performed by: INTERNAL MEDICINE

## 2024-01-05 PROCEDURE — 99153 MOD SED SAME PHYS/QHP EA: CPT | Performed by: INTERNAL MEDICINE

## 2024-01-05 PROCEDURE — 85097 BONE MARROW INTERPRETATION: CPT | Performed by: INTERNAL MEDICINE

## 2024-01-05 PROCEDURE — 88264 CHROMOSOME ANALYSIS 20-25: CPT | Performed by: INTERNAL MEDICINE

## 2024-01-05 PROCEDURE — 99152 MOD SED SAME PHYS/QHP 5/>YRS: CPT | Performed by: INTERNAL MEDICINE

## 2024-01-05 PROCEDURE — 88275 CYTOGENETICS 100-300: CPT | Performed by: INTERNAL MEDICINE

## 2024-01-05 PROCEDURE — 88291 CYTO/MOLECULAR REPORT: CPT | Performed by: INTERNAL MEDICINE

## 2024-01-05 PROCEDURE — 77012 CT SCAN FOR NEEDLE BIOPSY: CPT | Performed by: INTERNAL MEDICINE

## 2024-01-05 PROCEDURE — 38222 DX BONE MARROW BX & ASPIR: CPT | Performed by: INTERNAL MEDICINE

## 2024-01-05 PROCEDURE — 88305 TISSUE EXAM BY PATHOLOGIST: CPT | Performed by: INTERNAL MEDICINE

## 2024-01-05 PROCEDURE — 88237 TISSUE CULTURE BONE MARROW: CPT | Performed by: INTERNAL MEDICINE

## 2024-01-05 PROCEDURE — 85027 COMPLETE CBC AUTOMATED: CPT

## 2024-01-05 PROCEDURE — 85610 PROTHROMBIN TIME: CPT

## 2024-01-05 PROCEDURE — 88313 SPECIAL STAINS GROUP 2: CPT | Performed by: INTERNAL MEDICINE

## 2024-01-05 PROCEDURE — 88341 IMHCHEM/IMCYTCHM EA ADD ANTB: CPT | Performed by: INTERNAL MEDICINE

## 2024-01-05 PROCEDURE — 88271 CYTOGENETICS DNA PROBE: CPT | Performed by: INTERNAL MEDICINE

## 2024-01-05 RX ORDER — MIDAZOLAM HYDROCHLORIDE 1 MG/ML
INJECTION INTRAMUSCULAR; INTRAVENOUS
Status: COMPLETED
Start: 2024-01-05 | End: 2024-01-05

## 2024-01-05 RX ORDER — MIDAZOLAM HYDROCHLORIDE 1 MG/ML
1 INJECTION INTRAMUSCULAR; INTRAVENOUS EVERY 5 MIN PRN
Status: ACTIVE | OUTPATIENT
Start: 2024-01-05 | End: 2024-01-05

## 2024-01-05 RX ORDER — FLUMAZENIL 0.1 MG/ML
0.2 INJECTION INTRAVENOUS AS NEEDED
Status: DISCONTINUED | OUTPATIENT
Start: 2024-01-05 | End: 2024-01-07

## 2024-01-05 RX ORDER — NALOXONE HYDROCHLORIDE 0.4 MG/ML
80 INJECTION, SOLUTION INTRAMUSCULAR; INTRAVENOUS; SUBCUTANEOUS AS NEEDED
Status: DISCONTINUED | OUTPATIENT
Start: 2024-01-05 | End: 2024-01-07

## 2024-01-05 RX ORDER — SODIUM CHLORIDE 9 MG/ML
INJECTION, SOLUTION INTRAVENOUS CONTINUOUS
Status: DISCONTINUED | OUTPATIENT
Start: 2024-01-05 | End: 2024-01-07

## 2024-01-05 RX ADMIN — MIDAZOLAM HYDROCHLORIDE 1 MG: 1 INJECTION INTRAMUSCULAR; INTRAVENOUS at 09:25:00

## 2024-01-05 RX ADMIN — SODIUM CHLORIDE: 9 INJECTION, SOLUTION INTRAVENOUS at 09:20:00

## 2024-01-05 NOTE — DISCHARGE INSTRUCTIONS
Home Care [facility] Department of Radiology    Biopsy of any type      Have someone drive you home after your procedure.  You may not drive yourself home    AFTER YOU ARRIVE HOME    Take things easy for the rest of the day after your biopsy.  You may be sore in the biopsy area for one to five days  DO drink plenty of fluids  DO resume your regular diet  DO keep a bandage on the biopsy site for at least 24 hours  DO NOT take a hot bath or shower for at least 12 hours  DO NOT drive or operative machinery for at least 24 hours  DO NOT smoke for at least 24 hours  DO NOT do any strenuous exercise or lifting for at least 48 hours  DO call your doctor immediately if  You start bleeding  There is any change in color or temperature of the area where the biopsy was performed  You develop increasing pain in shortness of breath

## 2024-01-05 NOTE — H&P
[facility]   History & Physical    Elfego Sandoval Patient Status:  Outpatient    10/8/1960 MRN [id number]   Location [facility] CT Attending Hoda Canada MD   Hosp Day # 0 PCP Sabra Keane     Admitting Diagnosis:   MGUS    History of Present Illness:   62 yo M w/ MGUS    History   Past Medical History:  Past Medical History:   Diagnosis Date    COVID-19 2023    COUGH, NO HOSPITALIZATION    Depression     Hypogonadism male     IBS (irritable bowel syndrome)     Monoclonal gammopathy of unknown significance (MGUS)     IgG kappa MGUS    Osteoarthritis     Prediabetes     DIET CONTROLLED    SLEEP APNEA     Sleep apnea     CPAP    Visual impairment     GLASSES       Past Surgical History:  Past Surgical History:   Procedure Laterality Date    COLONOSCOPY      COLONOSCOPY,BIOPSY  2003    hyperplastic polyps    COLONOSCOPY,DIAGNOSTIC  2011     hemorrhoids, diveriticulosis, 2 5mm ascending adenomatous polyps    COLONOSCOPY,DIAGNOSTIC  2016    hepatic flexure and transverse polyps-adenomas    COLONOSCOPY,DIAGNOSTIC  2020    hepatic flexure, 2 sigmoid polyps    EGD N/A 2020    Procedure: ESOPHAGOGASTRODUODENOSCOPY, COLONOSCOPY, POSSIBLE BIOPSY, POSSIBLE POLYPECTOMY 50447, 44660;  Surgeon: Wes Bynum MD;  [facility]    HERNIA SURGERY      LAPAROSCOPY,DIAGNOSTIC      wnl    OTHER SURGICAL HISTORY      PN cyst removal    SIGMOIDOSCOPY,DIAGNOSTIC      polyps seen    SIGMOIDOSCOPY,DIAGNOSTIC      internal hemorrhoids    UPPER GI ENDOSCOPY,DIAGNOSIS  2011    wnl, SB Bx    UPPER GI ENDOSCOPY,DIAGNOSIS  2020    normal, gastric and SB Bx taken       Social History:  Social History     Tobacco Use    Smoking status: Never    Smokeless tobacco: Never   Substance Use Topics    Alcohol use: No     Alcohol/week: 0.0 standard drinks of alcohol        Family History:  Family History   Problem Relation Age of Onset     Cancer Father         prostate cancer    Diabetes Mother     Hypertension Mother     Cancer Mother 81        breast    Other (Other) Brother         polyps       Allergies/Medications:   Allergies:  No Known Allergies    Medications:    Current Outpatient Medications:     buPROPion  MG Oral Tablet 24 Hr, Take 1 tablet (150 mg total) by mouth daily., Disp: , Rfl:     FLUoxetine HCl 40 MG Oral Cap, Take 1 capsule (40 mg total) by mouth daily., Disp: , Rfl:     gabapentin 300 MG Oral Cap, Take by mouth. 300mg in AM, 300mg in afternoon, 600mg QHS, Disp: , Rfl:     buPROPion 300 MG Oral Tablet 24 Hr, Take 1 tablet (300 mg total) by mouth before breakfast., Disp: , Rfl:     Vitamin D, Cholecalciferol, 50 MCG (2000 UT) Oral Cap, Take 4,000 Units by mouth daily., Disp: , Rfl:     Physical Exam & Review of Systems:   Physical Exam:    /78 (BP Location: Left arm)   Pulse 64   Resp 18   Ht 70\"   Wt 290 lb   SpO2 95%   BMI 41.61 kg/m²     General: NAD  Neck: No JVD  Lungs: CTA bilat  Heart: RRR, S1, S2  Abdomen: Soft, NT/ND, BS+x4  Extremities: Warm, dry, no LE edema bilat  Pulses: 2+ bilat DP    Results:   Labs:  Recent Labs   Lab 01/05/24  0811   RBC 4.78   HGB 13.3   HCT 42.4   MCV 88.7   MCH 27.8   MCHC 31.4   RDW 15.1   WBC 3.8*   .0     Recent Labs   Lab 01/05/24  0811   PTP 13.2   INR 1.00     No results for input(s): \"GLU\", \"BUN\", \"CREATSERUM\", \"GFRAA\", \"GFRNAA\", \"CA\", \"NA\", \"K\", \"CL\", \"CO2\" in the last 168 hours.    Assessment/Plan:   Impression: 64 yo M w/ MGUS    I have discussed with the patient and/or legal representative the potential benefits, risks, and side effects of this procedure, the likelihood of the patient achieving goals; and the potential problems that might occur during recuperation.  I discussed reasonable alternatives to the procedure, including risks, benefits and side effects related to the alternatives, and risks related to not receiving this  procedure.      Recommendations: CT guided bone marrow biopsy    Mulugeta Mathew MD  1/5/2024  9:17 AM

## 2024-01-05 NOTE — PROCEDURES
[facility]  Procedure Note    Elfego Sandoval Patient Status:  Outpatient    10/8/1960 MRN [id number]   Location [facility] CT Attending Hoda Canada MD   Hosp Day # 0 PCP Sabra Keane     Procedure: CT bone marrow biopsy L iliac    Pre-Procedure Diagnosis:  MGUS    Post-Procedure Diagnosis: Same    Anesthesia:  Local and Sedation    Findings:  11g bone marrow aspirate and core of L iliac bone    Specimens: As above    Blood Loss:  Minimal    Tourniquet Time: None  Complications:  None  Drains:  None    Secondary Diagnosis:  None    Mulugeta Mathew MD  2024

## 2024-01-05 NOTE — IMAGING NOTE
Pt here for CT Bone Marrow Biopsy. Pre procedure vitals checked. IV access to right hand saline lock. 0.9 NS IV initiated to maintain patency.  Informed consent obtained by Dr MONI Mathew. Positioned pt prone on scanner. Obtained biopsy. Specimen sent to Pathology.    Presently denies pain. Tolerated procedure well. Vital signs stable on room air.  SBAR report given to Dom TANG. Transported pt to Rm 2257 accompanied by Wing TANG.

## 2024-01-11 LAB
CD10 CELLS NFR SPEC: 11 %
CD10/CD19: 11 %
CD19 CELLS NFR SPEC: 22 %
CD19+ MM: 14 %
CD19+/CD200+: 14 %
CD19+/CD38+ MM: 14 %
CD2 CELLS NFR SPEC: 80 %
CD20 CELLS NFR SPEC: 16 %
CD200 CELLS: 16 %
CD3 CELLS NFR SPEC: 68 %
CD3+/TCRGD+: 1 %
CD3+CD4+ CELLS NFR SPEC: 46 %
CD3+CD4+ CELLS/CD3+CD8+ CLL SPEC: 2.4
CD3+CD8+ CELLS NFR SPEC: 19 %
CD3-/CD56+: 16 %
CD34 CELLS NFR SPEC: <1 %
CD38 CELLS NFR SPEC: 12 %
CD38+/CD19+: 8 %
CD45 CELLS NFR SPEC: 100 %
CD45-/CD38+ KAPPA: 89 %
CD45-/CD38+ LAMBDA: 5 %
CD5 CELLS NFR SPEC: 60 %
CD5/CD19 CELLS: 1 %
CD56 MM: 2 %
CD7 CELLS NFR SPEC: 78 %
CELL SURF KAPPA/LAMBDA RATIO: 2.3
CELL SURF LAMBDA LIGHT CHAIN: 6 %
CELL SURFACE KAPPA LIGHT CHAIN: 14 %
CELLS ANALYZED MULTIPLE MYELOMA: 100
CELLS COUNTED MULTPLE MYELOMA: 100
TCR G-D CELLS NFR SPEC: 1 %

## 2024-01-25 ENCOUNTER — PATIENT MESSAGE (OUTPATIENT)
Dept: HEMATOLOGY/ONCOLOGY | Facility: HOSPITAL | Age: 64
End: 2024-01-25

## 2024-01-30 NOTE — PROGRESS NOTES
Virtual Telephone Check-In    Elfego Sandoval verbally consents to a Virtual/Telephone Check-In visit on 01/30/24.  Patient has been referred to the [facility] website at [email or web address] to review the yearly Consent to Treat document.    Patient understands and accepts financial responsibility for any deductible, co-insurance and/or co-pays associated with this service.    Duration of the service: 25 minutes      [facility] Progress Note      Patient Name:  Elfego Sandoval  YOB: 1960  Medical Record Number:  [id number]    Date of visit:  1/31/2024      CHIEF COMPLAINT: MGUS    HPI:     63 year old that I have followed since 8/20 for  IgG kappa MGUS.  On chart review, noted since 9/12.  Bone marrow biopsy done.  Telephone visit.    No new areas of bone pain.  No fevers, chills, night sweats.    SOCIAL HISTORY:    , works nights in printing.     ROS:     Constitutional: no fever, chills fatigue,night sweats or weight loss  Neurologic: no headache, seizures, diplopia or weakness  Respiratory: no cough, SOB or hemoptysis  Cardiovascular: no chest pain, ankle swelling, PEREZ  GI: no nausea, vomiting, diarrhea or BRBPR  Musculoskeletal: no body-ache or joint pain  Dermatologic: no alopecia, rash, pruritis  : no hematuria, dysuria or frequency  Psych: no confusion or depression   Heme: no easy bruising or bleeding     ALLERGIES:  No Known Allergies    MEDICATIONS:    Current Outpatient Medications   Medication Sig Dispense Refill    buPROPion  MG Oral Tablet 24 Hr Take 1 tablet (150 mg total) by mouth daily.      FLUoxetine HCl 40 MG Oral Cap Take 1 capsule (40 mg total) by mouth daily.      gabapentin 300 MG Oral Cap Take by mouth. 300mg in AM, 300mg in afternoon, 600mg QHS      buPROPion 300 MG Oral Tablet 24 Hr Take 1 tablet (300 mg total) by mouth before breakfast.      Vitamin D, Cholecalciferol, 50 MCG (2000 UT) Oral Cap Take 4,000 Units by mouth daily.         VITALS:      PS:   ECOG:     PHYSICAL EXAM:    Emotional well being: Patient's emotional well being was assessed.  No issues requiring acute psychosocial intervention were identified.     LABS:     No results found for this or any previous visit (from the past 24 hour(s)).    ASSESSMENT AND PLAN:     # IgG kappa MGUS: Intermediate/high risk.  Diag 9/12.  M spike 11/23: 1.76 g/dL.  Several skeletal surveys have been negative, last done 10/18.   Bone marrow biopsy 1/24 showed 5-10% plasma cells.  Normal cytogenetics.  FISH panel for myeloma showed clone positive for CCND1 translocation.  Translocation 11; 14 has a neutral or favorable prognosis and multiple myeloma.    Continue surveillance.  Discussed risk of progression.  Recommend labs, 24-hour urine and whole-body CT in 6 months, virtual visit with me after that.    ORDERS PLACED:        Procedures    CBC With Differential With Platelet    Comp Metabolic Panel (14)    Monoclonal Protein Study    Immunoglobulin A/G/M, Quant    LDH    Uric Acid    Beta-2 Microglobulin, Serum    BENCE DIAS PROTEIN, 24 HOUR URINE PANEL [E]    EDW Only - CT WHOLE BODY LOW DOSE MULTIPLE MYELOMA (FXG=[id number])     Hillary Canada M.D.    [facility]    [address] [place], [address]    1/31/2024

## 2024-01-31 ENCOUNTER — VIRTUAL PHONE E/M (OUTPATIENT)
Dept: HEMATOLOGY/ONCOLOGY | Facility: HOSPITAL | Age: 64
End: 2024-01-31
Attending: INTERNAL MEDICINE
Payer: COMMERCIAL

## 2024-01-31 DIAGNOSIS — D47.2 MGUS (MONOCLONAL GAMMOPATHY OF UNKNOWN SIGNIFICANCE): Primary | ICD-10-CM

## 2024-01-31 DIAGNOSIS — D89.2 PARAPROTEINEMIA: ICD-10-CM

## 2024-01-31 PROCEDURE — 99214 OFFICE O/P EST MOD 30 MIN: CPT | Performed by: INTERNAL MEDICINE

## 2024-04-10 ENCOUNTER — OFFICE VISIT (OUTPATIENT)
Dept: NEUROLOGY | Facility: CLINIC | Age: 64
End: 2024-04-10
Payer: COMMERCIAL

## 2024-04-10 VITALS
SYSTOLIC BLOOD PRESSURE: 130 MMHG | DIASTOLIC BLOOD PRESSURE: 60 MMHG | WEIGHT: 285 LBS | OXYGEN SATURATION: 97 % | RESPIRATION RATE: 18 BRPM | HEIGHT: 70 IN | BODY MASS INDEX: 40.8 KG/M2 | HEART RATE: 62 BPM

## 2024-04-10 DIAGNOSIS — G62.9 NEUROPATHY: Primary | ICD-10-CM

## 2024-04-10 DIAGNOSIS — D47.2 MGUS (MONOCLONAL GAMMOPATHY OF UNKNOWN SIGNIFICANCE): ICD-10-CM

## 2024-04-10 PROCEDURE — 3075F SYST BP GE 130 - 139MM HG: CPT | Performed by: OTHER

## 2024-04-10 PROCEDURE — 3078F DIAST BP <80 MM HG: CPT | Performed by: OTHER

## 2024-04-10 PROCEDURE — 3008F BODY MASS INDEX DOCD: CPT | Performed by: OTHER

## 2024-04-10 PROCEDURE — 99213 OFFICE O/P EST LOW 20 MIN: CPT | Performed by: OTHER

## 2024-04-10 NOTE — PATIENT INSTRUCTIONS
Refill policies:    Allow 2-3 business days for refills; controlled substances may take longer.  Contact your pharmacy at least 5 days prior to running out of medication and have them send an electronic request or submit request through the “request refill” option in your [employer] account.  Refills are not addressed on weekends; covering physicians do not authorize routine medications on weekends.  No narcotics or controlled substances are refilled after noon on Fridays or by on call physicians.  By law, narcotics must be electronically prescribed.  A 30 day supply with no refills is the maximum allowed.  If your prescription is due for a refill, you may be due for a follow up appointment.  To best provide you care, patients receiving routine medications need to be seen at least once a year.  Patients receiving narcotic/controlled substance medications need to be seen at least once every 3 months.  In the event that your preferred pharmacy does not have the requested medication in stock (e.g. Backordered), it is your responsibility to find another pharmacy that has the requested medication available.  We will gladly send a new prescription to that pharmacy at your request.    Scheduling Tests:    If your physician has ordered radiology tests such as MRI or CT scans, please contact Central Scheduling at [phone] right away to schedule the test.  Once scheduled, the [facility] Centralized Referral Team will work with your insurance carrier to obtain pre-certification or prior authorization.  Depending on your insurance carrier, approval may take 3-10 days.  It is highly recommended patients assure they have received an authorization before having a test performed.  If test is done without insurance authorization, patient may be responsible for the entire amount billed.      Precertification and Prior Authorizations:  If your physician has recommended that you have a procedure or additional testing performed the [facility]  Centralized Referral Team will contact your insurance carrier to obtain pre-certification or prior authorization.    You are strongly encouraged to contact your insurance carrier to verify that your procedure/test has been approved and is a COVERED benefit.  Although the [facility] Centralized Referral Team does its due diligence, the insurance carrier gives the disclaimer that \"Although the procedure is authorized, this does not guarantee payment.\"    Ultimately the patient is responsible for payment.   Thank you for your understanding in this matter.  Paperwork Completion:  If you require FMLA or disability paperwork for your recovery, please make sure to either drop it off or have it faxed to our office at [phone]. Be sure the form has your name and date of birth on it.  The form will be faxed to our Forms Department and they will complete it for you.  There is a 25$ fee for all forms that need to be filled out.  Please be aware there is a 10-14 day turnaround time.  You will need to sign a release of information (MARY) form if your paperwork does not come with one.  You may call the Forms Department with any questions at [phone].  Their fax number is [phone].

## 2024-04-10 NOTE — PROGRESS NOTES
[facility] Progress Note    HPI  Chief Complaint   Patient presents with    Neuropathy     3 month follow up neuropathy. States that there Is no improvement, having numbness in both feet       As per my initial H&P from 1/4/2024,   \" Elfego Sandoval is a 63 year old, who presents for evaluation of neuropathy.  Patient has history of MGUS and has been following with hematology/oncology.  He presents due to to symptoms of numbness in feet for the past 10 years.  In the past 4 years, he notes this is more noticeable. He used to feel like he was \"walking on a balled up sock\" but more recently noted numbness and cold sensation more notable at night.  He is on gabapentin for the past several years currently at 300 mg 4 times daily.  He denies side effects of excessive sedation, rash, double vision or peripheral edema.     Patient currently notes some irritation in his heel on the left side which improves when he walks more.  He denies tingling/numbness in hands currently but used to have this when sleeping and has been wearing brace on R hand for carpal tunnel symptoms with improvement; denies issues with dropping objects from hands. He states he has been following with neurology at [facility] Dr Quinonez and had EMG done at [facility] 9/15/2023, which showed evidence for axonal polyneuropathy with superimposed R median entrapment neuropathy.     Patient denies any falls or tripping over feet and is independent without using cane or walker.   Otherwise, patient denies any recent weight change, fevers, chills, nausea, double vision/ blurry vision / loss of vision, chest pain, palpitations, shortness of breath, rashes, joint pains, bowel / bladder incontinence or mood issues. \"      Since last visit, he started to wear different athletic shoes and has noted some improvement in pain in the heel but continues to have same tingling/ numbness in feet and has numbness/cold sensation in the bottom and top of toes when  going to sleep; no pain when walking like he is walking on coals or rocks. He denies dropping objects from hands; overall, slight improvement and not worsening; he mentions cousin / aunt had MS and father had balance issues but denies family history of neuropathy.     Past Medical History:    COVID-19    COUGH, NO HOSPITALIZATION    Depression    Hypogonadism male    IBS (irritable bowel syndrome)    Monoclonal gammopathy of unknown significance (MGUS)    IgG kappa MGUS    Osteoarthritis    Prediabetes    DIET CONTROLLED    SLEEP APNEA    Sleep apnea    CPAP    Visual impairment    GLASSES     Past Surgical History:   Procedure Laterality Date    Colonoscopy      Colonoscopy,biopsy  02/2003    hyperplastic polyps    Colonoscopy,diagnostic  07/25/2011     hemorrhoids, diveriticulosis, 2 5mm ascending adenomatous polyps    Colonoscopy,diagnostic  08/29/2016    hepatic flexure and transverse polyps-adenomas    Colonoscopy,diagnostic  12/21/2020    hepatic flexure, 2 sigmoid polyps    Egd N/A 12/21/2020    Procedure: ESOPHAGOGASTRODUODENOSCOPY, COLONOSCOPY, POSSIBLE BIOPSY, POSSIBLE POLYPECTOMY 67416, [id number];  Surgeon: Wes Bynum MD;  Location: [facility]    Hernia surgery  1997    Laparoscopy,diagnostic  1998    wnl    Other surgical history      PN cyst removal    Sigmoidoscopy,diagnostic  2003    polyps seen    Sigmoidoscopy,diagnostic  1998    internal hemorrhoids    Upper gi endoscopy,diagnosis  07/25/2011    wnl, SB Bx    Upper gi endoscopy,diagnosis  12/21/2020    normal, gastric and SB Bx taken     Family History   Problem Relation Age of Onset    Cancer Father         prostate cancer    Diabetes Mother     Hypertension Mother     Cancer Mother 81        breast    Other (Other) Brother         polyps     Social History     Socioeconomic History    Marital status:    Tobacco Use    Smoking status: Never    Smokeless tobacco: Never   Vaping Use    Vaping status: Never Used   Substance  and Sexual Activity    Alcohol use: No     Alcohol/week: 0.0 standard drinks of alcohol    Drug use: No   Other Topics Concern    Caffeine Concern Yes     Comment: 1 cup coffee    Exercise No       No Known Allergies      Current Outpatient Medications:     buPROPion  MG Oral Tablet 24 Hr, Take 1 tablet (150 mg total) by mouth daily., Disp: , Rfl:     FLUoxetine HCl 40 MG Oral Cap, Take 1 capsule (40 mg total) by mouth daily., Disp: , Rfl:     gabapentin 300 MG Oral Cap, Take by mouth. 300mg in AM, 300mg in afternoon, 600mg QHS, Disp: , Rfl:     buPROPion 300 MG Oral Tablet 24 Hr, Take 1 tablet (300 mg total) by mouth before breakfast., Disp: , Rfl:     Vitamin D, Cholecalciferol, 50 MCG (2000 UT) Oral Cap, Take 4,000 Units by mouth daily., Disp: , Rfl:     Review of Systems:  No chest pain or palpitations; otherwise as noted in HPI.    Exam:  /60   Pulse 62   Resp 18   Ht 70\"   Wt 285 lb (129.3 kg)   SpO2 97%   BMI 40.89 kg/m²   Estimated body mass index is 40.89 kg/m² as calculated from the following:    Height as of this encounter: 70\".    Weight as of this encounter: 285 lb (129.3 kg).    Gen: well developed, well nourished, no acute distress  HEENT: normocephalic  Heart; normal S1/S2, regular rate and rhythm  Lungs: clear to auscultation bilaterally  Extremities: no edema, peripheral pulses intact    Neck: supple, full range of motion; no carotid bruits    Fundoscopic Exam: optic discs sharp bilaterally    Neuro:  Mental status:  Orientation: Alert and oriented to person, place, time  Speech Fluent and conversational    CN: PERRL, EOMI with no nystagmus, VFF, smile symmetric, sensation intact, tongue and palate midline, SCM intact, otherwise, CN 2-12 intact  Motor: 5/5 strength throughout, tone normal  DTR: 2+ in UE and patella, trace ankle jerks bilaterally; toes downgoing bilaterally; no clonus   Sensory:   Pin is reduced in LE up to ankle bilaterally  Vibration is reduced only briefly  present 2-3 sec R foot and 2 on L side at great toes  Proprioception is normal  Coord: FNF intact with no tremor or dysmetria; rapid alternating movements intact bilaterally  Romberg: absent  Gait: Casual gait independent without cane or walker but mildly broad based; not able to walk on heels or toes due to pain and able to tandem for a few steps     Labs:  None new    Prior as noted below    alk on heels or toes due to pain and able to tandem for a few steps     TEST RESULTS/DATA REVIEWED:   Labs     Reviewed     Component      Latest Ref [facility] 11/3/2023   WBC      4.0 - 11.0 x10(3) uL 4.1    RBC      4.30 - 5.70 x10(6)uL 5.03    Hemoglobin      13.0 - 17.5 g/dL 14.0    Hematocrit      39.0 - 53.0 % 43.1    Platelet Count      150.0 - 450.0 10(3)uL 223.0    MCV      80.0 - 100.0 fL 85.7    MCH      26.0 - 34.0 pg 27.8    MCHC      31.0 - 37.0 g/dL 32.5    RDW      % 14.0    Prelim Neutrophil Abs      1.50 - 7.70 x10 (3) uL 1.86    Neutrophils Absolute      1.50 - 7.70 x10(3) uL 1.86    Lymphocytes Absolute      1.00 - 4.00 x10(3) uL 1.68    Monocytes Absolute      0.10 - 1.00 x10(3) uL 0.45    Eosinophils Absolute      0.00 - 0.70 x10(3) uL 0.05    Basophils Absolute      0.00 - 0.20 x10(3) uL 0.01    Immature Granulocyte Absolute      0.00 - 1.00 x10(3) uL 0.02    Neutrophils %      % 45.7    Lymphocytes %      % 41.3    Monocytes %      % 11.1    Eosinophils %      % 1.2    Basophils %      % 0.2    Immature Granulocyte %      % 0.5    Glucose      70 - 99 mg/dL 94    Sodium      136 - 145 mmol/L 137    Potassium      3.5 - 5.1 mmol/L 4.4    Chloride      98 - 112 mmol/L 106    Carbon Dioxide, Total      21.0 - 32.0 mmol/L 27.0    ANION GAP      0 - 18 mmol/L 4    BUN      9 - 23 mg/dL 20    CREATININE      0.70 - 1.30 mg/dL 1.15    CALCIUM      8.5 - 10.1 mg/dL 8.9    CALCULATED OSMOLALITY      275 - 295 mOsm/kg 286    EGFR      >=60 mL/min/1.73m2 72    AST (SGOT)      15 - 37 U/L 22    ALT (SGPT)      16 - 61  U/L 38    ALKALINE PHOSPHATASE      45 - 117 U/L 60    Total Bilirubin      0.1 - 2.0 mg/dL 0.6    PROTEIN, TOTAL      6.4 - 8.2 g/dL 9.1 (H)    PROTEIN, TOTAL      5.7 - 8.2 g/dL 8.4 (H)    Albumin      3.4 - 5.0 g/dL 3.2 (L)    Albumin      3.75 - 5.21 g/dL 3.62 (L)    Globulin      2.8 - 4.4 g/dL 5.9 (H)    A/G Ratio      1.0 - 2.0  0.5 (L)    Patient Fasting for CMP? Yes    ALPHA-1-GLOBULINS      0.19 - 0.46 g/dL 0.27    ALPHA-2-GLOBULINS      0.48 - 1.05 g/dL 0.98    BETA GLOBULINS      0.68 - 1.23 g/dL 0.83    GAMMA GLOBULINS      0.62 - 1.70 g/dL 2.70 (H)    ALBUMIN/GLOBULIN RATIO      1.00 - 2.00  0.76 (L)    SPE INTERPRETATION Monoclonal spike in the gamma region.    IMMUNOFIXATION Monoclonal IgG kappa. If clinically indicated, 24 hour urine monoclonal protein studies is suggested.    KAPPA FREE LIGHT CHAIN      0.330 - 1.940 mg/dL 6.116 (H)    LAMBDA FREE LIGHT CHAIN      0.571 - 2.630 mg/dL 1.049    KAPPA/LAMBDA FLC RATIO      0.26 - 1.65  5.83 (H)    M-Mark      <=0.00 g/dL 1.76 (H)    Reviewed By: Reviewed by Kelly Brady M.D. Pathology 11/08/23 at 5:33 PM    IMMUNOGLOBULIN A      70.00 - 312.00 mg/dL 152.00    IMMUNOGLOBULIN G      791 - 1,643 mg/dL 2,850 (H)    IMMUNOGLOBULIN M      43.0 - 279.0 mg/dL 31.3 (L)       7/26/2023:  Mag Ab: negative per report in EMR    Imaging:  None new    Other procedures  None new    Prior as noted below      NCS/EMG (9/15/2023):      Full report to be scanned     Findings     Significant for absent sural, superficial peroneal sensory response; prolonged peak latency R median sensory with reduced amplitude (4.79 msec and 9.5 amp, velocity 36.5); median / ulnar mixed nerve comparison latency dif 1.02 msec, R median motor borderline prolonged peak latency 4.79 msec; otherwise normal     EMG with polyphase R TA, reduced recruitment R medial gastroc  Impression:   Median neuropathy at the Right carpal tunnel  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  Patient has history of MGUS and has been following with hematology/oncology.  He presents due to to symptoms of numbness in feet for the past 10 years.  In the past 4 years, he notes this is more noticeable. He used to feel like he was \"walking on a balled up sock\" but more recently noted numbness and cold sensation more notable at night.       Neurologic exam is suggestive of mild neuropathy with impaired tandem gait, reduced DTRs distally and reduced sensation to vibration and pin in distal LE mainly up to the ankles.  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  There is no evidence for acute denervation changes on NCS/EMG and no suggestion for a primary demyelinating neuropathy.  Etiology for neuropathy may be due to MGUS but will check for other causes with B12 level.        In addition, recommend patient follow up with hematology / oncology for management of MGUS.  Of note, he has previously had Anti-MAG Ab testing done which was negative and given mild symptoms, would hold off on immunosuppressive therapy for now and monitor for changes.     For treatment of neuropathic pain, he is on gabapentin for the past several years currently at 300 mg 4 times daily.  He denies side effects of excessive sedation, rash, double vision or peripheral edema. And recommend continues same dose; for R carpal tunnel syndrome, recommend continue to wear wrist splints at night; will refer to PT for gait training and potential therapeutics    1. Neuropathy  As noted above   - Physical Therapy Referral - [facility]    2. MGUS (monoclonal gammopathy of unknown significance)  As noted above    Return in about 4 months (around 8/10/2024).      Se Milan MD, Neurology  [place]  [facility] [place]  Pager [phone]  4/10/2024

## 2025-03-19 ENCOUNTER — HOSPITAL ENCOUNTER (OUTPATIENT)
Dept: CT IMAGING | Facility: HOSPITAL | Age: 65
Discharge: HOME | End: 2025-03-19
Attending: INTERNAL MEDICINE
Payer: COMMERCIAL

## 2025-03-19 ENCOUNTER — LAB ENCOUNTER (OUTPATIENT)
Dept: LAB | Facility: HOSPITAL | Age: 65
End: 2025-03-19
Attending: INTERNAL MEDICINE
Payer: COMMERCIAL

## 2025-03-19 DIAGNOSIS — D64.9 ANEMIA, UNSPECIFIED TYPE: ICD-10-CM

## 2025-03-19 DIAGNOSIS — D47.2 MGUS (MONOCLONAL GAMMOPATHY OF UNKNOWN SIGNIFICANCE): ICD-10-CM

## 2025-03-19 LAB
M PROTEIN 24H UR ELPH-MRATE: 200.6 MG/24 HR (ref ?–149.1)
SPECIMEN VOL UR: 1700 ML

## 2025-03-19 PROCEDURE — 86335 IMMUNFIX E-PHORSIS/URINE/CSF: CPT

## 2025-03-19 PROCEDURE — 84156 ASSAY OF PROTEIN URINE: CPT

## 2025-03-19 PROCEDURE — 84166 PROTEIN E-PHORESIS/URINE/CSF: CPT

## 2025-03-19 PROCEDURE — 76497 UNLISTED CT PROCEDURE: CPT | Performed by: INTERNAL MEDICINE

## 2025-04-07 ENCOUNTER — LAB ENCOUNTER (OUTPATIENT)
Dept: LAB | Facility: HOSPITAL | Age: 65
End: 2025-04-07
Attending: INTERNAL MEDICINE
Payer: COMMERCIAL

## 2025-04-07 DIAGNOSIS — D47.2 MGUS (MONOCLONAL GAMMOPATHY OF UNKNOWN SIGNIFICANCE): ICD-10-CM

## 2025-04-07 LAB
ALBUMIN SERPL-MCNC: 4.2 G/DL (ref 3.2–4.8)
ALBUMIN/GLOB SERPL: 1 {RATIO} (ref 1–2)
ALP LIVER SERPL-CCNC: 58 U/L
ALT SERPL-CCNC: 55 U/L
ANION GAP SERPL CALC-SCNC: 6 MMOL/L (ref 0–18)
AST SERPL-CCNC: 38 U/L (ref ?–34)
B2 MICROGLOB SERPL-MCNC: 2.33 (ref 1–2.4)
BASOPHILS # BLD AUTO: 0.02 X10(3) UL (ref 0–0.2)
BASOPHILS NFR BLD AUTO: 0.4 %
BILIRUB SERPL-MCNC: 0.6 MG/DL (ref 0.2–1.1)
BUN BLD-MCNC: 18 MG/DL (ref 9–23)
CALCIUM BLD-MCNC: 9.4 MG/DL (ref 8.7–10.6)
CHLORIDE SERPL-SCNC: 103 MMOL/L (ref 98–112)
CO2 SERPL-SCNC: 31 MMOL/L (ref 21–32)
CREAT BLD-MCNC: 0.97 MG/DL
EGFRCR SERPLBLD CKD-EPI 2021: 87 ML/MIN/1.73M2 (ref 60–?)
EOSINOPHIL # BLD AUTO: 0.08 X10(3) UL (ref 0–0.7)
EOSINOPHIL NFR BLD AUTO: 1.6 %
ERYTHROCYTE [DISTWIDTH] IN BLOOD BY AUTOMATED COUNT: 14.7 %
FASTING STATUS PATIENT QL REPORTED: NO
GLOBULIN PLAS-MCNC: 4.3 G/DL (ref 2–3.5)
GLUCOSE BLD-MCNC: 96 MG/DL (ref 70–99)
HCT VFR BLD AUTO: 43.2 %
HGB BLD-MCNC: 13.9 G/DL
IGA SERPL-MCNC: 160.2 MG/DL (ref 40–350)
IGM SERPL-MCNC: 39 MG/DL (ref 50–300)
IMM GRANULOCYTES # BLD AUTO: 0.03 X10(3) UL (ref 0–1)
IMM GRANULOCYTES NFR BLD: 0.6 %
IMMUNOGLOBULIN PNL SER-MCNC: 2794 MG/DL (ref 650–1600)
LYMPHOCYTES # BLD AUTO: 2.24 X10(3) UL (ref 1–4)
LYMPHOCYTES NFR BLD AUTO: 44 %
MCH RBC QN AUTO: 27.9 PG (ref 26–34)
MCHC RBC AUTO-ENTMCNC: 32.2 G/DL (ref 31–37)
MCV RBC AUTO: 86.7 FL
MONOCYTES # BLD AUTO: 0.44 X10(3) UL (ref 0.1–1)
MONOCYTES NFR BLD AUTO: 8.6 %
NEUTROPHILS # BLD AUTO: 2.28 X10 (3) UL (ref 1.5–7.7)
NEUTROPHILS # BLD AUTO: 2.28 X10(3) UL (ref 1.5–7.7)
NEUTROPHILS NFR BLD AUTO: 44.8 %
OSMOLALITY SERPL CALC.SUM OF ELEC: 292 MOSM/KG (ref 275–295)
PLATELET # BLD AUTO: 230 10(3)UL (ref 150–450)
POTASSIUM SERPL-SCNC: 4.6 MMOL/L (ref 3.5–5.1)
PROT SERPL-MCNC: 8.5 G/DL (ref 5.7–8.2)
RBC # BLD AUTO: 4.98 X10(6)UL
SODIUM SERPL-SCNC: 140 MMOL/L (ref 136–145)
WBC # BLD AUTO: 5.1 X10(3) UL (ref 4–11)

## 2025-04-07 PROCEDURE — 36415 COLL VENOUS BLD VENIPUNCTURE: CPT

## 2025-04-07 PROCEDURE — 82232 ASSAY OF BETA-2 PROTEIN: CPT

## 2025-04-07 PROCEDURE — 85025 COMPLETE CBC W/AUTO DIFF WBC: CPT

## 2025-04-07 PROCEDURE — 84165 PROTEIN E-PHORESIS SERUM: CPT

## 2025-04-07 PROCEDURE — 83521 IG LIGHT CHAINS FREE EACH: CPT

## 2025-04-07 PROCEDURE — 82784 ASSAY IGA/IGD/IGG/IGM EACH: CPT

## 2025-04-07 PROCEDURE — 86334 IMMUNOFIX E-PHORESIS SERUM: CPT

## 2025-04-07 PROCEDURE — 80053 COMPREHEN METABOLIC PANEL: CPT

## 2025-04-07 NOTE — PROGRESS NOTES
[facility]cer Center Progress Note      Patient Name:  Elfego Sandoval  YOB: 1960  Medical Record Number:  [id number]    Date of visit:  4/9/2025    CHIEF COMPLAINT: MGUS    HPI:     64 year old that I have followed since 8/20 for  IgG kappa MGUS.  On chart review, noted since 9/12.  Bone marrow bx 1/24-5-10% plasma cells.  Presents for evaluation.    Knee pain, neuropathy, mildly worse. Occasional gabapentin.     SOCIAL HISTORY:    , works nights in printing.     ROS:     Constitutional: no fever, chills fatigue,night sweats or weight loss  Neurologic: no headache, seizures, diplopia or weakness  Respiratory: no cough, SOB or hemoptysis  Cardiovascular: no chest pain, ankle swelling, PEREZ  GI: no nausea, vomiting, diarrhea or BRBPR  Musculoskeletal: no body-ache or joint pain  Dermatologic: no alopecia, rash, pruritis  : no hematuria, dysuria or frequency  Psych: no confusion or depression   Heme: no easy bruising or bleeding     ALLERGIES:  No Known Allergies    MEDICATIONS:    Current Outpatient Medications   Medication Sig Dispense Refill    buPROPion  MG Oral Tablet 24 Hr Take 1 tablet (150 mg total) by mouth daily.      FLUoxetine HCl 40 MG Oral Cap Take 1 capsule (40 mg total) by mouth daily.      gabapentin 300 MG Oral Cap Take by mouth. 300mg in AM, 300mg in afternoon, 600mg QHS      buPROPion 300 MG Oral Tablet 24 Hr Take 1 tablet (300 mg total) by mouth before breakfast.      Vitamin D, Cholecalciferol, 50 MCG (2000 UT) Oral Cap Take 4,000 Units by mouth daily.         VITALS:  There were no vitals filed for this visit.      PS:  ECOG:     PHYSICAL EXAM:    General: alert and oriented x 3, not in acute distress.  Neck: supple.  No JVD /adenopathy.  CVS: S1S2, regular  Rhythm, no murmurs.   Lungs: Clear to auscultation and percussion.  Abdomen: Soft, non tender, no hepato-splenomegaly.  Extremities:  No edema.  CNS: no focal deficit      Emotional well being: Patient's emotional  well being was assessed.  No issues requiring acute psychosocial intervention were identified.     LABS:     No results found for this or any previous visit (from the past 24 hours).    ASSESSMENT AND PLAN:     # IgG kappa MGUS: Intermediate/high risk.  Diag 9/12.  M spike 11/23: 1.76 g/dL.  Several skeletal surveys have been negative, last done 10/18.   Bone marrow biopsy 1/24 showed 5-10% plasma cells.  Normal cytogenetics.  FISH panel for myeloma showed clone positive for CCND1 translocation.  Translocation 11; 14 has a neutral or favorable prognosis and multiple myeloma.    IgG levels remain relatively stable.  Stable kappa light chains.  No anemia, renal dysfunction or hypercalcemia.  Whole-body CT 3/25 did not show any concerning myelomatous lesions.    Continue surveillance.     ORDERS PLACED:          Procedures    CBC With Differential With Platelet    Comp Metabolic Panel (14)    Monoclonal Protein Study    Immunoglobulin A/G/M, Quant    LDH    Uric Acid    BETA-2 MICROGLOBULIN, SERUM [E]    BENCE DIAS PROTEIN, 24 HOUR URINE PANEL [E]    BETA-2 MICROGLOBULIN, SERUM [E]    Uric Acid    LDH    Immunoglobulin A/G/M, Quant    Monoclonal Protein Study    Comp Metabolic Panel (14)    CBC With Differential With Platelet     Return for Labs 6 months. MD Con 1 yr.      Hillary Canada M.D.    [facility]  [address], [place], [address]    4/9/2025

## 2025-04-09 ENCOUNTER — OFFICE VISIT (OUTPATIENT)
Age: 65
End: 2025-04-09
Attending: INTERNAL MEDICINE
Payer: COMMERCIAL

## 2025-04-09 VITALS
RESPIRATION RATE: 18 BRPM | TEMPERATURE: 97 F | SYSTOLIC BLOOD PRESSURE: 157 MMHG | OXYGEN SATURATION: 96 % | HEART RATE: 75 BPM | DIASTOLIC BLOOD PRESSURE: 84 MMHG | WEIGHT: 292 LBS | BODY MASS INDEX: 42 KG/M2

## 2025-04-09 DIAGNOSIS — D47.2 MGUS (MONOCLONAL GAMMOPATHY OF UNKNOWN SIGNIFICANCE): Primary | ICD-10-CM

## 2025-04-09 DIAGNOSIS — D89.2 PARAPROTEINEMIA: ICD-10-CM

## 2025-04-09 LAB
ALBUMIN SERPL ELPH-MCNC: 3.67 G/DL (ref 3.75–5.21)
ALBUMIN/GLOB SERPL: 0.81 {RATIO} (ref 1–2)
ALPHA1 GLOB SERPL ELPH-MCNC: 0.24 G/DL (ref 0.19–0.46)
ALPHA2 GLOB SERPL ELPH-MCNC: 0.93 G/DL (ref 0.48–1.05)
B-GLOBULIN SERPL ELPH-MCNC: 0.85 G/DL (ref 0.68–1.23)
GAMMA GLOB SERPL ELPH-MCNC: 2.51 G/DL (ref 0.62–1.7)
KAPPA LC FREE SER-MCNC: 4.45 MG/DL (ref 0.33–1.94)
KAPPA LC FREE/LAMBDA FREE SER NEPH: 4.56 {RATIO} (ref 0.26–1.65)
LAMBDA LC FREE SERPL-MCNC: 0.98 MG/DL (ref 0.57–2.63)
M PROTEIN 1 SERPL ELPH-MCNC: 1.63 G/DL (ref ?–0)
PROT SERPL-MCNC: 8.2 G/DL (ref 5.7–8.2)

## 2025-04-09 RX ORDER — DESVENLAFAXINE 50 MG/1
TABLET, FILM COATED, EXTENDED RELEASE ORAL
COMMUNITY
Start: 2025-02-18

## 2025-04-09 NOTE — PROGRESS NOTES
Education Record    Learner:  Patient    Disease / Diagnosis:MGUS    Barriers / Limitations:  None   Comments:    Method:  Discussion   Comments:    General Topics:  Plan of care reviewed   Comments:    Outcome:  Shows understanding   Comments:   Has seen ortho for right knee pain, arthritis, and very little cartilage. Has had cortisone shot, and taking NSAIDS more regularly.     Neuropathy to feet, some days better than others.   Takes gabapentin, but does forget some doses.

## (undated) NOTE — LETTER
To: DR. GM  Patient Name: Elfego Sandoval DOB-Age / Sex: 10/8/1960-A: 63 y  male   Medical Records: [id number] [facility]: [id number]    Request for History & Physical for Radiology Procedure at [facility]    The above patient is scheduled to have a procedure performed in Radiology.  In order for the procedure to be performed safely, a comprehensive History & Physical, to include the Review of Systems, is required within 30 days of the scheduled appointment.      Procedure:    Date Scheduled:   Ordered by (Ordering Physician):  DR. LEBRON     Please [place] the completed [place] [facility] [place] [phone].  For Questions: Call [facility] [phone]    If you cannot provide us with a comprehensive History & Physical prior to this appointment, you will need to cancel and reschedule the appointment by calling Central Scheduling [phone].  Thank you.

## (undated) NOTE — LETTER
01/04/24    Dear Dr. Sabra Keane        I saw your patient, Elfego Sandoval for an initial visit.  Please see my H&P note enclosed below.  Let me know if you have any questions.    Thank you  Se Milan MD, Neurology  [facility]  Pager [phone]  Office:    [address] [place] [id number]  [phone]      1/4/2024      [facility] New Patient / Consult Visit    Elfego Sandoval is a 63 year old male.                         Referring MD: No ref. provider found    Chief Complaint   Patient presents with    Neurologic Problem     Referred by Heme/Onc, C/O bilat feet numbness w/o trips/falls, notes occasional balance changes    Test Results     EMG 9/15/23       HPI:    Elfego Sandoval is a 63 year old, who presents for evaluation of neuropathy.  Patient has history of MGUS and has been following with hematology/oncology.  He presents due to to symptoms of numbness in feet for the past 10 years.  In the past 4 years, he notes this is more noticeable. He used to feel like he was \"walking on a balled up sock\" but more recently noted numbness and cold sensation more notable at night.  He is on gabapentin for the past several years currently at 300 mg 4 times daily.  He denies side effects of excessive sedation, rash, double vision or peripheral edema.     Patient currently notes some irritation in his heel on the left side which improves when he walks more.  He denies tingling/numbness in hands currently but used to have this when sleeping and has been wearing brace on R hand for carpal tunnel symptoms with improvement; denies issues with dropping objects from hands. 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     Patient denies any falls or tripping over feet and is independent without using  cane or walker.   Otherwise, patient denies any recent weight change, fevers, chills, nausea, double vision/ blurry vision / loss of vision, chest pain, palpitations, shortness of breath, rashes, joint pains, bowel / bladder incontinence or mood issues.     Past Medical History:   Diagnosis Date    COVID-19 12/12/2023    COUGH, NO HOSPITALIZATION    Depression     Hypogonadism male     IBS (irritable bowel syndrome)     Monoclonal gammopathy of unknown significance (MGUS)     IgG kappa MGUS    Osteoarthritis     Prediabetes     DIET CONTROLLED    SLEEP APNEA     Sleep apnea     CPAP    Visual impairment     GLASSES     Past Surgical History:   Procedure Laterality Date    COLONOSCOPY      COLONOSCOPY,BIOPSY  02/2003    hyperplastic polyps    COLONOSCOPY,DIAGNOSTIC  07/25/2011     hemorrhoids, diveriticulosis, 2 5mm ascending adenomatous polyps    COLONOSCOPY,DIAGNOSTIC  08/29/2016    hepatic flexure and transverse polyps-adenomas    COLONOSCOPY,DIAGNOSTIC  12/21/2020    hepatic flexure, 2 sigmoid polyps    EGD N/A 12/21/2020    Procedure: ESOPHAGOGASTRODUODENOSCOPY, COLONOSCOPY, POSSIBLE BIOPSY, POSSIBLE POLYPECTOMY 40706, 13491;  Surgeon: Wes Bynum MD;  [facility]    HERNIA SURGERY  1997    LAPAROSCOPY,DIAGNOSTIC  1998    wnl    OTHER SURGICAL HISTORY      PN cyst removal    SIGMOIDOSCOPY,DIAGNOSTIC  2003    polyps seen    SIGMOIDOSCOPY,DIAGNOSTIC  1998    internal hemorrhoids    UPPER GI ENDOSCOPY,DIAGNOSIS  07/25/2011    wnl, SB Bx    UPPER GI ENDOSCOPY,DIAGNOSIS  12/21/2020    normal, gastric and SB Bx taken     Social History     Socioeconomic History    Marital status:    Tobacco Use    Smoking status: Never    Smokeless tobacco: Never   Vaping Use    Vaping Use: Never used   Substance and Sexual Activity    Alcohol use: No     Alcohol/week: 0.0 standard drinks of alcohol    Drug use: No   Other Topics Concern    Caffeine Concern Yes     Comment: 1 cup coffee     Exercise No     Family History   Problem Relation Age of Onset    Cancer Father         prostate cancer    Diabetes Mother     Hypertension Mother     Cancer Mother 81        breast    Other (Other) Brother         polyps       Allergies:  No Known Allergies   Current Meds:  Current Outpatient Medications   Medication Sig Dispense Refill    buPROPion  MG Oral Tablet 24 Hr Take 1 tablet (150 mg total) by mouth daily.      FLUoxetine HCl 40 MG Oral Cap Take 1 capsule (40 mg total) by mouth daily.      gabapentin 300 MG Oral Cap Take by mouth. 300mg in AM, 300mg in afternoon, 600mg QHS      buPROPion 300 MG Oral Tablet 24 Hr Take 1 tablet (300 mg total) by mouth before breakfast.      Vitamin D, Cholecalciferol, 50 MCG (2000 UT) Oral Cap Take 4,000 Units by mouth daily.            ROS:   A comprehensive 10 point review of systems was completed.  Pertinent positives and negatives noted in the the HPI.      PHYSICAL EXAM:   /68 (BP Location: Left arm, Patient Position: Sitting, Cuff Size: adult)   Pulse 66   Resp 16   Ht 70\"   Wt 290 lb (131.5 kg)   BMI 41.61 kg/m²   Estimated body mass index is 41.61 kg/m² as calculated from the following:    Height as of this encounter: 70\".    Weight as of this encounter: 290 lb (131.5 kg).    GENERAL: well developed, well nourished, in no apparent distress  SKIN: no rashes  EYES: sclera anicteric, conjunctiva normal  HEENT: normocephalic  CARDIOVASCULAR: S1, S2 normal, RRR  LUNGS: clear to auscultation bilaterally  EXTREMITIES: no cyanosis, peripheral pulses intact    Neck: Supple; full range of motion; no carotid bruits    Mental status:  Alert and oriented to time, place, person, and situation  Speech: fluent  Language: normal naming, repetition, and comprehension  Memory: normal  Attention/concentration: normal    Fundoscopic Exam: optic discs sharp bilaterally    Cranial Nerves: II through XII  Optic:    Pupils: equally round and reactive to light with direct and  consensual responses, normal accomodation   Visual acuity: Normal              Visual fields: Normal  Oculomotor/Trochlear/Abducens:    Eye Movements: EOMI without nystagmus  Trigeminal:   Facial sensation:intact to light touch bilaterally  Facial:   Smile symmetric, eyebrow raise symmetric  Vestibulocochlear:   Hearing: normal bilaterally  Glossopharyngeal/Vagus:   Palate elevates symmetrically with midline uvula  Spinal accessory:   Shoulder Shrug: normal bilaterally   Lateral head turn: normal bilaterally  Hypoglossal:   Tongue movement: protrusion is midline with normal lateral movements    Motor System:  Strength: 5/5 throughout  Tone: normal    Sensory:  Pin is reduced in LE up to ankle bilaterally  Vibration is reduced only briefly present 2-3 sec R foot and 2 on L side at great toes  Proprioception is normal  Romberg is absent    Coordination:  Finger to nose normal bilaterally  Rapid alternating movements normal bilaterally  Heel to shin is normal bilaterally    DTRs:   2+ in UE and patella, trace ankle jerks bilaterally; toes downgoing bilaterally; no clonus          Gait:  Casual gait independent without cane or walker but mildly broad based; not able to walk on heels or toes due to pain and able to tandem for a few steps    TEST RESULTS/DATA REVIEWED:   Labs    Reviewed    Component      Latest Ref [facility] 11/3/2023   WBC      4.0 - 11.0 x10(3) uL 4.1    RBC      4.30 - 5.70 x10(6)uL 5.03    Hemoglobin      13.0 - 17.5 g/dL 14.0    Hematocrit      39.0 - 53.0 % 43.1    Platelet Count      150.0 - 450.0 10(3)uL 223.0    MCV      80.0 - 100.0 fL 85.7    MCH      26.0 - 34.0 pg 27.8    MCHC      31.0 - 37.0 g/dL 32.5    RDW      % 14.0    Prelim Neutrophil Abs      1.50 - 7.70 x10 (3) uL 1.86    Neutrophils Absolute      1.50 - 7.70 x10(3) uL 1.86    Lymphocytes Absolute      1.00 - 4.00 x10(3) uL 1.68    Monocytes Absolute      0.10 - 1.00 x10(3) uL 0.45    Eosinophils Absolute      0.00 - 0.70 x10(3) uL 0.05     Basophils Absolute      0.00 - 0.20 x10(3) uL 0.01    Immature Granulocyte Absolute      0.00 - 1.00 x10(3) uL 0.02    Neutrophils %      % 45.7    Lymphocytes %      % 41.3    Monocytes %      % 11.1    Eosinophils %      % 1.2    Basophils %      % 0.2    Immature Granulocyte %      % 0.5    Glucose      70 - 99 mg/dL 94    Sodium      136 - 145 mmol/L 137    Potassium      3.5 - 5.1 mmol/L 4.4    Chloride      98 - 112 mmol/L 106    Carbon Dioxide, Total      21.0 - 32.0 mmol/L 27.0    ANION GAP      0 - 18 mmol/L 4    BUN      9 - 23 mg/dL 20    CREATININE      0.70 - 1.30 mg/dL 1.15    CALCIUM      8.5 - 10.1 mg/dL 8.9    CALCULATED OSMOLALITY      275 - 295 mOsm/kg 286    EGFR      >=60 mL/min/1.73m2 72    AST (SGOT)      15 - 37 U/L 22    ALT (SGPT)      16 - 61 U/L 38    ALKALINE PHOSPHATASE      45 - 117 U/L 60    Total Bilirubin      0.1 - 2.0 mg/dL 0.6    PROTEIN, TOTAL      6.4 - 8.2 g/dL 9.1 (H)    PROTEIN, TOTAL      5.7 - 8.2 g/dL 8.4 (H)    Albumin      3.4 - 5.0 g/dL 3.2 (L)    Albumin      3.75 - 5.21 g/dL 3.62 (L)    Globulin      2.8 - 4.4 g/dL 5.9 (H)    A/G Ratio      1.0 - 2.0  0.5 (L)    Patient Fasting for CMP? Yes    ALPHA-1-GLOBULINS      0.19 - 0.46 g/dL 0.27    ALPHA-2-GLOBULINS      0.48 - 1.05 g/dL 0.98    BETA GLOBULINS      0.68 - 1.23 g/dL 0.83    GAMMA GLOBULINS      0.62 - 1.70 g/dL 2.70 (H)    ALBUMIN/GLOBULIN RATIO      1.00 - 2.00  0.76 (L)    SPE INTERPRETATION Monoclonal spike in the gamma region.    IMMUNOFIXATION Monoclonal IgG kappa. If clinically indicated, 24 hour urine monoclonal protein studies is suggested.    KAPPA FREE LIGHT CHAIN      0.330 - 1.940 mg/dL 6.116 (H)    LAMBDA FREE LIGHT CHAIN      0.571 - 2.630 mg/dL 1.049    KAPPA/LAMBDA FLC RATIO      0.26 - 1.65  5.83 (H)    M-Mark      <=0.00 g/dL 1.76 (H)    Reviewed By: Reviewed by Kelly Brady M.D. Pathology 11/08/23 at 5:33 PM    IMMUNOGLOBULIN A      70.00 - 312.00 mg/dL 152.00    IMMUNOGLOBULIN G       791 - 1,643 mg/dL 2,850 (H)    IMMUNOGLOBULIN M      43.0 - 279.0 mg/dL 31.3 (L)       7/26/2023:  Mag Ab: negative per report in EMR    Other procedures  Reviewed    NCS/EMG (9/15/2023):     Full report to be scanned    Findings    Significant for absent sural, superficial peroneal sensory response; prolonged peak latency R median sensory with reduced amplitude (4.79 msec and 9.5 amp, velocity 36.5); median / ulnar mixed nerve comparison latency dif 1.02 msec, R median motor borderline prolonged peak latency 4.79 msec; otherwise normal    EMG with polyphase R TA, reduced recruitment R medial gastroc  Impression:   Median neuropathy at the Right carpal tunnel  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  Patient has history of MGUS and has been following with hematology/oncology.  He presents due to to symptoms of numbness in feet for the past 10 years.  In the past 4 years, he notes this is more noticeable. He used to feel like he was \"walking on a balled up sock\" but more recently noted numbness and cold sensation more notable at night.      Neurologic exam is suggestive of mild neuropathy with impaired tandem gait, reduced DTRs distally and reduced sensation to vibration and pin in distal LE mainly up to the ankles.  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  There is no evidence for acute denervation changes on NCS/EMG and no suggestion for a primary demyelinating neuropathy.  Etiology for neuropathy may be due to MGUS but will check for other causes with B12 level.  In addition, recommend patient follow up with hematology / oncology for management of MGUS.  Of note, he has previously had Anti-MAG Ab testing done which was negative and given mild symptoms, would hold  off on immunosuppressive therapy for now and monitor for changes.     For treatment of neuropathic pain, he is on gabapentin for the past several years currently at 300 mg 4 times daily.  He denies side effects of excessive sedation, rash, double vision or peripheral edema. And recommend continues same dose; for R carpal tunnel syndrome, recommend continue to wear wrist splints at night.    1. Neuropathy  As noted above   - Vitamin B12; Future    2. MGUS (monoclonal gammopathy of unknown significance)  As noted above    Return in about 3 months (around 4/4/2024).    Copy of note was sent to PCP    45 total minutes spent, including chart review, discussion of pertinent labs and NCS/EMG with plan of care / recommendations as noted above; patient allowed to ask questions and all questions answered to the best of my ability     Se Milan MD, Neurology  [facility]  Pager [phone]  1/4/2024